# Patient Record
Sex: FEMALE | Race: OTHER | HISPANIC OR LATINO
[De-identification: names, ages, dates, MRNs, and addresses within clinical notes are randomized per-mention and may not be internally consistent; named-entity substitution may affect disease eponyms.]

---

## 2017-03-06 ENCOUNTER — APPOINTMENT (OUTPATIENT)
Dept: NEUROLOGY | Facility: CLINIC | Age: 57
End: 2017-03-06

## 2017-03-06 VITALS
HEIGHT: 63 IN | SYSTOLIC BLOOD PRESSURE: 144 MMHG | DIASTOLIC BLOOD PRESSURE: 87 MMHG | HEART RATE: 71 BPM | BODY MASS INDEX: 35.44 KG/M2 | WEIGHT: 200 LBS

## 2017-03-06 DIAGNOSIS — Z87.891 PERSONAL HISTORY OF NICOTINE DEPENDENCE: ICD-10-CM

## 2017-03-06 DIAGNOSIS — Z82.49 FAMILY HISTORY OF ISCHEMIC HEART DISEASE AND OTHER DISEASES OF THE CIRCULATORY SYSTEM: ICD-10-CM

## 2017-03-06 DIAGNOSIS — R73.03 PREDIABETES.: ICD-10-CM

## 2017-03-06 DIAGNOSIS — Z83.3 FAMILY HISTORY OF DIABETES MELLITUS: ICD-10-CM

## 2017-03-06 DIAGNOSIS — Z86.39 PERSONAL HISTORY OF OTHER ENDOCRINE, NUTRITIONAL AND METABOLIC DISEASE: ICD-10-CM

## 2017-03-06 DIAGNOSIS — M54.12 RADICULOPATHY, CERVICAL REGION: ICD-10-CM

## 2017-03-06 DIAGNOSIS — D34 BENIGN NEOPLASM OF THYROID GLAND: ICD-10-CM

## 2017-03-06 RX ORDER — CLOTRIMAZOLE AND BETAMETHASONE DIPROPIONATE 10; .5 MG/G; MG/G
1-0.05 CREAM TOPICAL
Qty: 15 | Refills: 0 | Status: COMPLETED | COMMUNITY
Start: 2016-09-20

## 2017-03-06 RX ORDER — FLUCONAZOLE 100 MG/1
100 TABLET ORAL
Qty: 10 | Refills: 0 | Status: COMPLETED | COMMUNITY
Start: 2016-09-20

## 2017-03-21 ENCOUNTER — APPOINTMENT (OUTPATIENT)
Dept: NEUROSURGERY | Facility: CLINIC | Age: 57
End: 2017-03-21

## 2017-03-21 VITALS
HEIGHT: 63 IN | BODY MASS INDEX: 35.44 KG/M2 | HEART RATE: 60 BPM | SYSTOLIC BLOOD PRESSURE: 130 MMHG | WEIGHT: 200 LBS | DIASTOLIC BLOOD PRESSURE: 80 MMHG

## 2017-03-21 DIAGNOSIS — G89.29 OTHER CHRONIC PAIN: ICD-10-CM

## 2017-03-21 DIAGNOSIS — Z86.69 PERSONAL HISTORY OF OTHER DISEASES OF THE NERVOUS SYSTEM AND SENSE ORGANS: ICD-10-CM

## 2017-03-28 ENCOUNTER — OUTPATIENT (OUTPATIENT)
Dept: OUTPATIENT SERVICES | Facility: HOSPITAL | Age: 57
LOS: 1 days | Discharge: HOME | End: 2017-03-28

## 2017-03-28 ENCOUNTER — APPOINTMENT (OUTPATIENT)
Dept: RADIATION ONCOLOGY | Facility: CLINIC | Age: 57
End: 2017-03-28

## 2017-03-28 VITALS
HEART RATE: 80 BPM | BODY MASS INDEX: 35.14 KG/M2 | HEIGHT: 62.99 IN | RESPIRATION RATE: 16 BRPM | WEIGHT: 198.3 LBS | SYSTOLIC BLOOD PRESSURE: 126 MMHG | DIASTOLIC BLOOD PRESSURE: 84 MMHG | OXYGEN SATURATION: 100 %

## 2017-03-28 RX ORDER — VINPOCETINE 100 %
POWDER (GRAM) MISCELLANEOUS
Refills: 0 | Status: ACTIVE | COMMUNITY

## 2017-03-28 RX ORDER — CARBAMAZEPINE 200 MG/1
200 TABLET ORAL TWICE DAILY
Qty: 60 | Refills: 3 | Status: DISCONTINUED | COMMUNITY
Start: 2017-03-06 | End: 2017-03-28

## 2017-03-29 ENCOUNTER — OUTPATIENT (OUTPATIENT)
Dept: OUTPATIENT SERVICES | Facility: HOSPITAL | Age: 57
LOS: 1 days | End: 2017-03-29
Payer: COMMERCIAL

## 2017-03-29 DIAGNOSIS — Z51.89 ENCOUNTER FOR OTHER SPECIFIED AFTERCARE: ICD-10-CM

## 2017-03-29 DIAGNOSIS — M54.2 CERVICALGIA: ICD-10-CM

## 2017-03-29 PROCEDURE — 97162 PT EVAL MOD COMPLEX 30 MIN: CPT

## 2017-04-02 ENCOUNTER — FORM ENCOUNTER (OUTPATIENT)
Age: 57
End: 2017-04-02

## 2017-04-03 ENCOUNTER — APPOINTMENT (OUTPATIENT)
Dept: NEUROLOGY | Facility: CLINIC | Age: 57
End: 2017-04-03

## 2017-04-03 ENCOUNTER — OUTPATIENT (OUTPATIENT)
Dept: OUTPATIENT SERVICES | Facility: HOSPITAL | Age: 57
LOS: 1 days | End: 2017-04-03
Payer: COMMERCIAL

## 2017-04-03 ENCOUNTER — APPOINTMENT (OUTPATIENT)
Dept: MRI IMAGING | Facility: IMAGING CENTER | Age: 57
End: 2017-04-03

## 2017-04-03 DIAGNOSIS — G50.0 TRIGEMINAL NEURALGIA: ICD-10-CM

## 2017-04-03 PROCEDURE — 61798 SRS CRANIAL LESION COMPLEX: CPT

## 2017-04-03 PROCEDURE — 61800 APPLY SRS HEADFRAME ADD-ON: CPT

## 2017-04-03 PROCEDURE — 76498 UNLISTED MR PROCEDURE: CPT

## 2017-05-30 ENCOUNTER — APPOINTMENT (OUTPATIENT)
Dept: NEUROLOGY | Facility: CLINIC | Age: 57
End: 2017-05-30

## 2017-05-30 ENCOUNTER — APPOINTMENT (OUTPATIENT)
Dept: NEUROSURGERY | Facility: CLINIC | Age: 57
End: 2017-05-30

## 2017-05-30 VITALS
WEIGHT: 198 LBS | BODY MASS INDEX: 36.44 KG/M2 | SYSTOLIC BLOOD PRESSURE: 130 MMHG | DIASTOLIC BLOOD PRESSURE: 80 MMHG | HEIGHT: 62 IN | HEART RATE: 77 BPM

## 2017-05-31 RX ORDER — ROSUVASTATIN CALCIUM 5 MG/1
5 TABLET, FILM COATED ORAL
Qty: 90 | Refills: 0 | Status: ACTIVE | COMMUNITY
Start: 2017-04-13

## 2017-06-19 ENCOUNTER — FORM ENCOUNTER (OUTPATIENT)
Age: 57
End: 2017-06-19

## 2017-06-20 ENCOUNTER — APPOINTMENT (OUTPATIENT)
Dept: MRI IMAGING | Facility: CLINIC | Age: 57
End: 2017-06-20

## 2017-06-20 ENCOUNTER — OUTPATIENT (OUTPATIENT)
Dept: OUTPATIENT SERVICES | Facility: HOSPITAL | Age: 57
LOS: 1 days | End: 2017-06-20
Payer: COMMERCIAL

## 2017-06-20 DIAGNOSIS — G50.0 TRIGEMINAL NEURALGIA: ICD-10-CM

## 2017-06-20 PROCEDURE — A9585: CPT

## 2017-06-20 PROCEDURE — 70553 MRI BRAIN STEM W/O & W/DYE: CPT

## 2017-07-11 ENCOUNTER — APPOINTMENT (OUTPATIENT)
Dept: NEUROSURGERY | Facility: CLINIC | Age: 57
End: 2017-07-11

## 2017-07-11 VITALS
HEART RATE: 70 BPM | DIASTOLIC BLOOD PRESSURE: 89 MMHG | WEIGHT: 211 LBS | SYSTOLIC BLOOD PRESSURE: 138 MMHG | HEIGHT: 62 IN | BODY MASS INDEX: 38.83 KG/M2

## 2017-07-11 DIAGNOSIS — Z92.3 PERSONAL HISTORY OF IRRADIATION: ICD-10-CM

## 2017-07-11 DIAGNOSIS — G50.0 TRIGEMINAL NEURALGIA: ICD-10-CM

## 2017-07-14 PROBLEM — Z92.3 STATUS POST GAMMA KNIFE TREATMENT: Status: ACTIVE | Noted: 2017-07-14

## 2017-07-14 PROBLEM — G50.0 TRIGEMINAL NEURALGIA: Status: ACTIVE | Noted: 2017-03-06

## 2017-08-08 ENCOUNTER — MEDICATION RENEWAL (OUTPATIENT)
Age: 57
End: 2017-08-08

## 2017-08-22 ENCOUNTER — APPOINTMENT (OUTPATIENT)
Dept: NEUROSURGERY | Facility: CLINIC | Age: 57
End: 2017-08-22

## 2017-09-11 ENCOUNTER — APPOINTMENT (OUTPATIENT)
Dept: NEUROSURGERY | Facility: CLINIC | Age: 57
End: 2017-09-11

## 2017-09-11 ENCOUNTER — APPOINTMENT (OUTPATIENT)
Dept: NEUROLOGY | Facility: CLINIC | Age: 57
End: 2017-09-11

## 2017-11-03 ENCOUNTER — EMERGENCY (EMERGENCY)
Facility: HOSPITAL | Age: 57
LOS: 1 days | Discharge: HOME | End: 2017-11-03
Attending: EMERGENCY MEDICINE
Payer: COMMERCIAL

## 2017-11-03 VITALS
WEIGHT: 210.1 LBS | RESPIRATION RATE: 20 BRPM | OXYGEN SATURATION: 97 % | SYSTOLIC BLOOD PRESSURE: 124 MMHG | TEMPERATURE: 100 F | HEIGHT: 70 IN | HEART RATE: 105 BPM | DIASTOLIC BLOOD PRESSURE: 85 MMHG

## 2017-11-03 VITALS
SYSTOLIC BLOOD PRESSURE: 110 MMHG | RESPIRATION RATE: 18 BRPM | TEMPERATURE: 99 F | DIASTOLIC BLOOD PRESSURE: 74 MMHG | HEART RATE: 87 BPM | OXYGEN SATURATION: 95 %

## 2017-11-03 LAB
ALBUMIN SERPL ELPH-MCNC: 4.1 G/DL — SIGNIFICANT CHANGE UP (ref 3.3–5.2)
ALP SERPL-CCNC: 136 U/L — HIGH (ref 40–120)
ALT FLD-CCNC: 49 U/L — HIGH
ANION GAP SERPL CALC-SCNC: 11 MMOL/L — SIGNIFICANT CHANGE UP (ref 5–17)
APPEARANCE UR: CLEAR — SIGNIFICANT CHANGE UP
AST SERPL-CCNC: 28 U/L — SIGNIFICANT CHANGE UP
BILIRUB SERPL-MCNC: 0.4 MG/DL — SIGNIFICANT CHANGE UP (ref 0.4–2)
BILIRUB UR-MCNC: NEGATIVE — SIGNIFICANT CHANGE UP
BUN SERPL-MCNC: 12 MG/DL — SIGNIFICANT CHANGE UP (ref 8–20)
CALCIUM SERPL-MCNC: 8.5 MG/DL — LOW (ref 8.6–10.2)
CHLORIDE SERPL-SCNC: 95 MMOL/L — LOW (ref 98–107)
CO2 SERPL-SCNC: 28 MMOL/L — SIGNIFICANT CHANGE UP (ref 22–29)
COLOR SPEC: YELLOW — SIGNIFICANT CHANGE UP
CREAT SERPL-MCNC: 0.63 MG/DL — SIGNIFICANT CHANGE UP (ref 0.5–1.3)
DIFF PNL FLD: ABNORMAL
EPI CELLS # UR: SIGNIFICANT CHANGE UP
GLUCOSE SERPL-MCNC: 125 MG/DL — HIGH (ref 70–115)
GLUCOSE UR QL: NEGATIVE MG/DL — SIGNIFICANT CHANGE UP
HCT VFR BLD CALC: 37.7 % — SIGNIFICANT CHANGE UP (ref 37–47)
HGB BLD-MCNC: 12.7 G/DL — SIGNIFICANT CHANGE UP (ref 12–16)
KETONES UR-MCNC: NEGATIVE — SIGNIFICANT CHANGE UP
LEUKOCYTE ESTERASE UR-ACNC: ABNORMAL
LIDOCAIN IGE QN: 24 U/L — SIGNIFICANT CHANGE UP (ref 22–51)
MCHC RBC-ENTMCNC: 29.9 PG — SIGNIFICANT CHANGE UP (ref 27–31)
MCHC RBC-ENTMCNC: 33.7 G/DL — SIGNIFICANT CHANGE UP (ref 32–36)
MCV RBC AUTO: 88.7 FL — SIGNIFICANT CHANGE UP (ref 81–99)
NITRITE UR-MCNC: NEGATIVE — SIGNIFICANT CHANGE UP
PH UR: 6.5 — SIGNIFICANT CHANGE UP (ref 5–8)
PLATELET # BLD AUTO: 171 K/UL — SIGNIFICANT CHANGE UP (ref 150–400)
POTASSIUM SERPL-MCNC: 4.1 MMOL/L — SIGNIFICANT CHANGE UP (ref 3.5–5.3)
POTASSIUM SERPL-SCNC: 4.1 MMOL/L — SIGNIFICANT CHANGE UP (ref 3.5–5.3)
PROT SERPL-MCNC: 7.5 G/DL — SIGNIFICANT CHANGE UP (ref 6.6–8.7)
PROT UR-MCNC: 30 MG/DL
RBC # BLD: 4.25 M/UL — LOW (ref 4.4–5.2)
RBC # FLD: 14 % — SIGNIFICANT CHANGE UP (ref 11–15.6)
RBC CASTS # UR COMP ASSIST: ABNORMAL /HPF (ref 0–4)
SODIUM SERPL-SCNC: 134 MMOL/L — LOW (ref 135–145)
SP GR SPEC: 1.01 — SIGNIFICANT CHANGE UP (ref 1.01–1.02)
UROBILINOGEN FLD QL: NEGATIVE MG/DL — SIGNIFICANT CHANGE UP
WBC # BLD: 13.2 K/UL — HIGH (ref 4.8–10.8)
WBC # FLD AUTO: 13.2 K/UL — HIGH (ref 4.8–10.8)
WBC UR QL: ABNORMAL

## 2017-11-03 PROCEDURE — 81001 URINALYSIS AUTO W/SCOPE: CPT

## 2017-11-03 PROCEDURE — 80053 COMPREHEN METABOLIC PANEL: CPT

## 2017-11-03 PROCEDURE — 96374 THER/PROPH/DIAG INJ IV PUSH: CPT

## 2017-11-03 PROCEDURE — 99284 EMERGENCY DEPT VISIT MOD MDM: CPT

## 2017-11-03 PROCEDURE — 83690 ASSAY OF LIPASE: CPT

## 2017-11-03 PROCEDURE — 99284 EMERGENCY DEPT VISIT MOD MDM: CPT | Mod: 25

## 2017-11-03 PROCEDURE — 85027 COMPLETE CBC AUTOMATED: CPT

## 2017-11-03 PROCEDURE — 36415 COLL VENOUS BLD VENIPUNCTURE: CPT

## 2017-11-03 PROCEDURE — 87086 URINE CULTURE/COLONY COUNT: CPT

## 2017-11-03 PROCEDURE — 96375 TX/PRO/DX INJ NEW DRUG ADDON: CPT

## 2017-11-03 RX ORDER — L.ACIDOPH/B.ANIMALIS/B.LONGUM 15B CELL
1 CAPSULE ORAL
Qty: 30 | Refills: 0
Start: 2017-11-03 | End: 2017-12-03

## 2017-11-03 RX ORDER — CEFTRIAXONE 500 MG/1
1 INJECTION, POWDER, FOR SOLUTION INTRAMUSCULAR; INTRAVENOUS ONCE
Qty: 0 | Refills: 0 | Status: COMPLETED | OUTPATIENT
Start: 2017-11-03 | End: 2017-11-03

## 2017-11-03 RX ORDER — CEPHALEXIN 500 MG
1 CAPSULE ORAL
Qty: 20 | Refills: 0
Start: 2017-11-03 | End: 2017-11-13

## 2017-11-03 RX ORDER — SODIUM CHLORIDE 9 MG/ML
1000 INJECTION INTRAMUSCULAR; INTRAVENOUS; SUBCUTANEOUS ONCE
Qty: 0 | Refills: 0 | Status: COMPLETED | OUTPATIENT
Start: 2017-11-03 | End: 2017-11-03

## 2017-11-03 RX ORDER — IBUPROFEN 200 MG
400 TABLET ORAL ONCE
Qty: 0 | Refills: 0 | Status: COMPLETED | OUTPATIENT
Start: 2017-11-03 | End: 2017-11-03

## 2017-11-03 RX ORDER — METOCLOPRAMIDE HCL 10 MG
10 TABLET ORAL ONCE
Qty: 0 | Refills: 0 | Status: COMPLETED | OUTPATIENT
Start: 2017-11-03 | End: 2017-11-03

## 2017-11-03 RX ORDER — DIPHENHYDRAMINE HCL 50 MG
25 CAPSULE ORAL ONCE
Qty: 0 | Refills: 0 | Status: COMPLETED | OUTPATIENT
Start: 2017-11-03 | End: 2017-11-03

## 2017-11-03 RX ADMIN — CEFTRIAXONE 100 GRAM(S): 500 INJECTION, POWDER, FOR SOLUTION INTRAMUSCULAR; INTRAVENOUS at 22:12

## 2017-11-03 RX ADMIN — SODIUM CHLORIDE 1000 MILLILITER(S): 9 INJECTION INTRAMUSCULAR; INTRAVENOUS; SUBCUTANEOUS at 22:13

## 2017-11-03 RX ADMIN — Medication 25 MILLIGRAM(S): at 22:13

## 2017-11-03 RX ADMIN — Medication 10 MILLIGRAM(S): at 22:13

## 2017-11-03 RX ADMIN — Medication 400 MILLIGRAM(S): at 22:13

## 2017-11-03 NOTE — ED STATDOCS - ATTENDING CONTRIBUTION TO CARE
I, Rebecca Greenwood, performed the initial face to face bedside interview with this patient regarding history of present illness, review of symptoms and relevant past medical, social and family history.  I completed an independent physical examination.  I was the initial provider who evaluated this patient. I have signed out the follow up of any pending tests (i.e. labs, radiological studies) to the ACP.  I have communicated the patient’s plan of care and disposition with the ACP.

## 2017-11-03 NOTE — ED STATDOCS - PROGRESS NOTE DETAILS
PA: pt seen and evaluated by intake doctor. agree with intake hpi and pe. will follow up plan as per attending. labs reviewed. pt advised on results including abnormal results. pt advised her LFTs are elevated at baseline. pt with uti, will treat witH 1g Rocephin in ed and rx keflex with probiotic. pt advised to follow up with pmd and given a copy of results. pt tolerating po without issue. All questions answered and concerns addressed. pt verbalized understanding and agreement with plan and dx. pt advised to follow up with PMD. pt advised to return to ed for worsening symptoms including fever, cp, sob. will dc.

## 2017-11-03 NOTE — ED ADULT NURSE NOTE - OBJECTIVE STATEMENT
Pt. complaining of vomiting and body aches x 2 days.  Pt. states "everyone at home is sick".  Pt. denies diarrhea.  No abdominal tenderness noted.  Denies chest pain.  Denies sob or bess.  Denies chills.  Denies dysuria/frequency/hematuria.  Pt. states she has had a decrease in PO intake x 2 days.

## 2017-11-05 LAB
CULTURE RESULTS: SIGNIFICANT CHANGE UP
SPECIMEN SOURCE: SIGNIFICANT CHANGE UP

## 2017-12-18 ENCOUNTER — EMERGENCY (EMERGENCY)
Facility: HOSPITAL | Age: 57
LOS: 1 days | Discharge: HOME | End: 2017-12-18
Attending: STUDENT IN AN ORGANIZED HEALTH CARE EDUCATION/TRAINING PROGRAM
Payer: COMMERCIAL

## 2017-12-18 VITALS
SYSTOLIC BLOOD PRESSURE: 150 MMHG | HEIGHT: 63 IN | HEART RATE: 83 BPM | WEIGHT: 216.93 LBS | OXYGEN SATURATION: 99 % | DIASTOLIC BLOOD PRESSURE: 94 MMHG | RESPIRATION RATE: 20 BRPM | TEMPERATURE: 98 F

## 2017-12-18 VITALS
TEMPERATURE: 97 F | SYSTOLIC BLOOD PRESSURE: 143 MMHG | RESPIRATION RATE: 17 BRPM | OXYGEN SATURATION: 100 % | DIASTOLIC BLOOD PRESSURE: 88 MMHG | HEART RATE: 79 BPM

## 2017-12-18 LAB
APPEARANCE UR: ABNORMAL
BACTERIA # UR AUTO: ABNORMAL
BILIRUB UR-MCNC: NEGATIVE — SIGNIFICANT CHANGE UP
COLOR SPEC: ABNORMAL
DIFF PNL FLD: ABNORMAL
EPI CELLS # UR: SIGNIFICANT CHANGE UP
GLUCOSE UR QL: NEGATIVE MG/DL — SIGNIFICANT CHANGE UP
KETONES UR-MCNC: NEGATIVE — SIGNIFICANT CHANGE UP
LEUKOCYTE ESTERASE UR-ACNC: ABNORMAL
NITRITE UR-MCNC: NEGATIVE — SIGNIFICANT CHANGE UP
PH UR: 6.5 — SIGNIFICANT CHANGE UP (ref 5–8)
PROT UR-MCNC: 100 MG/DL
RBC CASTS # UR COMP ASSIST: ABNORMAL /HPF (ref 0–4)
SP GR SPEC: 1 — LOW (ref 1.01–1.02)
UROBILINOGEN FLD QL: NEGATIVE MG/DL — SIGNIFICANT CHANGE UP
WBC UR QL: ABNORMAL

## 2017-12-18 PROCEDURE — 81001 URINALYSIS AUTO W/SCOPE: CPT

## 2017-12-18 PROCEDURE — 99283 EMERGENCY DEPT VISIT LOW MDM: CPT

## 2017-12-18 PROCEDURE — 87086 URINE CULTURE/COLONY COUNT: CPT

## 2017-12-18 PROCEDURE — 87186 SC STD MICRODIL/AGAR DIL: CPT

## 2017-12-18 RX ORDER — CEPHALEXIN 500 MG
500 CAPSULE ORAL ONCE
Qty: 0 | Refills: 0 | Status: COMPLETED | OUTPATIENT
Start: 2017-12-18 | End: 2017-12-18

## 2017-12-18 RX ORDER — CEPHALEXIN 500 MG
1 CAPSULE ORAL
Qty: 28 | Refills: 0
Start: 2017-12-18 | End: 2017-12-24

## 2017-12-18 RX ADMIN — Medication 500 MILLIGRAM(S): at 23:51

## 2017-12-18 NOTE — ED PROVIDER NOTE - OBJECTIVE STATEMENT
56 y/o F pt with a pmhx of trigeminal neurologia, HTN, high cholesterol and Diverticulosis presents to the ED c/o burning on urination, suprapubic pain and hematuria x1 day. She explains that she felt burning that onset 1 day ago. Today she attempted to drink more water and cranberry juice with mild relief. She explains that she saw blood in her urine, which cause her to come to the ED today. Reports that her pain is only when she passes urine. No Hx of Renal stones. Pt has not taken pain medication for the issue. Denies fever, chills, n/v/d, back pain, chest pain, sob, blurred vision, sinus pressure, STD exposure, rash, anxiety, HA or any other complaints. NKDA.

## 2017-12-18 NOTE — ED ADULT NURSE NOTE - OBJECTIVE STATEMENT
PT axox3 c/o painful urination. Pt denies any blood in urine, fevers and states it started x 2 days ago. Pt states she has been having frequent utis.

## 2017-12-18 NOTE — ED PROVIDER NOTE - ATTENDING CONTRIBUTION TO CARE
56 yo female with acute UTI symptoms. I personally saw the patient with the PA, and completed the key components of the history and physical exam. I then discussed the management plan with the PA.

## 2017-12-18 NOTE — ED PROVIDER NOTE - PROGRESS NOTE DETAILS
UA is reviewed, noted a urinary tract infection, will give patient first dosage of keflex here, send over keflex to patients pharmacy, explained that urine culture results will not be in for 1-2 days, pt verbalizes understanding results and discharge instructions

## 2018-07-28 PROBLEM — R73.03 PREDIABETES: Status: RESOLVED | Noted: 2017-03-06 | Resolved: 2018-07-28

## 2018-08-01 PROBLEM — E78.00 PURE HYPERCHOLESTEROLEMIA, UNSPECIFIED: Chronic | Status: ACTIVE | Noted: 2017-11-04

## 2018-08-01 PROBLEM — G50.0 TRIGEMINAL NEURALGIA: Chronic | Status: ACTIVE | Noted: 2017-11-04

## 2018-08-01 PROBLEM — K57.90 DIVERTICULOSIS OF INTESTINE, PART UNSPECIFIED, WITHOUT PERFORATION OR ABSCESS WITHOUT BLEEDING: Chronic | Status: ACTIVE | Noted: 2017-11-04

## 2018-08-07 ENCOUNTER — APPOINTMENT (OUTPATIENT)
Dept: MRI IMAGING | Facility: CLINIC | Age: 58
End: 2018-08-07

## 2018-09-06 ENCOUNTER — APPOINTMENT (OUTPATIENT)
Dept: GASTROENTEROLOGY | Facility: CLINIC | Age: 58
End: 2018-09-06
Payer: COMMERCIAL

## 2018-09-06 VITALS
OXYGEN SATURATION: 99 % | HEIGHT: 62 IN | WEIGHT: 216 LBS | RESPIRATION RATE: 15 BRPM | DIASTOLIC BLOOD PRESSURE: 82 MMHG | HEART RATE: 68 BPM | SYSTOLIC BLOOD PRESSURE: 130 MMHG | BODY MASS INDEX: 39.75 KG/M2

## 2018-09-06 PROCEDURE — 99214 OFFICE O/P EST MOD 30 MIN: CPT

## 2018-09-06 RX ORDER — GABAPENTIN 100 MG/1
100 CAPSULE ORAL 3 TIMES DAILY
Qty: 90 | Refills: 6 | Status: DISCONTINUED | COMMUNITY
Start: 2017-05-10 | End: 2018-09-06

## 2018-09-06 RX ORDER — BACLOFEN 10 MG/1
10 TABLET ORAL
Qty: 135 | Refills: 3 | Status: DISCONTINUED | COMMUNITY
Start: 2017-06-26 | End: 2018-09-06

## 2018-09-06 RX ORDER — GABAPENTIN 800 MG/1
800 TABLET, COATED ORAL 3 TIMES DAILY
Qty: 90 | Refills: 6 | Status: ACTIVE | COMMUNITY
Start: 2017-06-05

## 2018-10-05 NOTE — ED STATDOCS - OBJECTIVE STATEMENT
56 y/o F pt with a PMHx of diverticulosis, HTN, high cholesterol, hiatal hernia, trigeminal neuralgia presents to the ED c/o vomiting, abdominal pain, myalgia and headache x2 days. Explains that she mad a different recipe than normal, which she believes onset the vomiting. Describes the vomit as "black." Last endoscopy was a couple of months ago, no gross findings. Denies d/c, fever, urinary symptoms, back pain, chest pain, SOB or any other complaints. NKDA. negative... 58 y/o F pt with a PMHx of diverticulosis, HTN, high cholesterol, hiatal hernia, trigeminal neuralgia presents to the ED c/o vomiting, abdominal pain, myalgia and headache x2 days. Explains that she mad a different recipe than normal, which she believes onset the vomiting. Describes the vomit as "black" but her stools are normal color. Last endoscopy was a couple of months ago, no gross findings. Denies d/c, fever, urinary symptoms, back pain, chest pain, SOB or any other complaints. NKDA.  Pt does take a PPI. No blood thinners.

## 2018-11-05 NOTE — ED ADULT NURSE NOTE - CINV DISCH TEACH PARTICIP
Patient
no rash/no shortness of breath/no vomiting/no chills/no headache/no abdominal pain/no fever/no diarrhea/no decreased eating/drinking

## 2019-02-06 ENCOUNTER — APPOINTMENT (OUTPATIENT)
Dept: GASTROENTEROLOGY | Facility: CLINIC | Age: 59
End: 2019-02-06
Payer: COMMERCIAL

## 2019-02-06 VITALS
OXYGEN SATURATION: 100 % | HEART RATE: 82 BPM | SYSTOLIC BLOOD PRESSURE: 122 MMHG | BODY MASS INDEX: 38.64 KG/M2 | DIASTOLIC BLOOD PRESSURE: 78 MMHG | WEIGHT: 210 LBS | HEIGHT: 62 IN | RESPIRATION RATE: 16 BRPM

## 2019-02-06 PROCEDURE — 99214 OFFICE O/P EST MOD 30 MIN: CPT

## 2019-02-06 NOTE — PHYSICAL EXAM
[General Appearance - Alert] : alert [General Appearance - In No Acute Distress] : in no acute distress [Sclera] : the sclera and conjunctiva were normal [PERRL With Normal Accommodation] : pupils were equal in size, round, and reactive to light [Extraocular Movements] : extraocular movements were intact [Outer Ear] : the ears and nose were normal in appearance [Oropharynx] : the oropharynx was normal [Neck Appearance] : the appearance of the neck was normal [Neck Cervical Mass (___cm)] : no neck mass was observed [Jugular Venous Distention Increased] : there was no jugular-venous distention [Thyroid Diffuse Enlargement] : the thyroid was not enlarged [Thyroid Nodule] : there were no palpable thyroid nodules [Auscultation Breath Sounds / Voice Sounds] : lungs were clear to auscultation bilaterally [Heart Rate And Rhythm] : heart rate was normal and rhythm regular [Heart Sounds] : normal S1 and S2 [Heart Sounds Gallop] : no gallops [Murmurs] : no murmurs [Heart Sounds Pericardial Friction Rub] : no pericardial rub [Full Pulse] : the pedal pulses are present [Edema] : there was no peripheral edema [Bowel Sounds] : normal bowel sounds [Abdomen Soft] : soft [Abdomen Tenderness] : non-tender [Abdomen Mass (___ Cm)] : no abdominal mass palpated [Cervical Lymph Nodes Enlarged Posterior Bilaterally] : posterior cervical [Cervical Lymph Nodes Enlarged Anterior Bilaterally] : anterior cervical [Supraclavicular Lymph Nodes Enlarged Bilaterally] : supraclavicular [No CVA Tenderness] : no ~M costovertebral angle tenderness [No Spinal Tenderness] : no spinal tenderness [Skin Color & Pigmentation] : normal skin color and pigmentation [Skin Turgor] : normal skin turgor [] : no rash [No Focal Deficits] : no focal deficits [Oriented To Time, Place, And Person] : oriented to person, place, and time [Impaired Insight] : insight and judgment were intact [Affect] : the affect was normal

## 2019-02-07 NOTE — HISTORY OF PRESENT ILLNESS
[de-identified] : The patient overnight for evaluation for reflux. She had been complaining of right upper quadrant painand recently underwent cholecystectomy. Since her cholecystectomy she has been having worsening off of reflux she takes omeprazole on a daily basis. She had a CT performed in 2016 which will be esophagitis.Colonoscopy performed in March 2016 was essentially normal except internal hemorrhoids. EGD biopsies were negative for any Silverman's esophagus or any Helicobacter pylori. She denies any smoking, alcohol or drug use.\par \par  Had reflux symptoms are not under control with PPI therapy.

## 2019-02-07 NOTE — ASSESSMENT
[FreeTextEntry1] : I discussed her symptoms at length. At this time, I am recommending starting pantoprazole 40 mg orally once daily. She can also start H2 blocker therapy at bedt time.If her symptoms are not under good control, she may need an upper endoscopy.We will followup on a short-term basis.

## 2019-03-29 ENCOUNTER — APPOINTMENT (OUTPATIENT)
Dept: GASTROENTEROLOGY | Facility: CLINIC | Age: 59
End: 2019-03-29
Payer: COMMERCIAL

## 2019-03-29 VITALS
OXYGEN SATURATION: 98 % | HEIGHT: 62 IN | BODY MASS INDEX: 37.17 KG/M2 | DIASTOLIC BLOOD PRESSURE: 96 MMHG | RESPIRATION RATE: 15 BRPM | HEART RATE: 75 BPM | WEIGHT: 202 LBS | SYSTOLIC BLOOD PRESSURE: 133 MMHG

## 2019-03-29 DIAGNOSIS — K44.9 DIAPHRAGMATIC HERNIA W/OUT OBSTRUCTION OR GANGRENE: ICD-10-CM

## 2019-03-29 DIAGNOSIS — K21.0 DIAPHRAGMATIC HERNIA W/OUT OBSTRUCTION OR GANGRENE: ICD-10-CM

## 2019-03-29 PROCEDURE — 99214 OFFICE O/P EST MOD 30 MIN: CPT

## 2019-03-29 RX ORDER — PANTOPRAZOLE 40 MG/1
40 TABLET, DELAYED RELEASE ORAL DAILY
Qty: 30 | Refills: 2 | Status: COMPLETED | COMMUNITY
Start: 2019-02-06 | End: 2019-03-29

## 2019-03-29 NOTE — ASSESSMENT
[FreeTextEntry1] : I am glad the patient is doing better. We will perform an upper endoscopy in 2 months to document if there is any significant esophagitis.if the upper endoscopy is unremarkable, we will taper the dose of omeprazole. Risks (including bleeding, pain, perforation, incomplete examination, adverse reactions to medications, aspiration and death), benefits and alternatives were discussed. Patient is agreeable for the EGD. The patient is medically optimized for the procedure. We will schedule the patient for the procedure.\par

## 2019-06-15 ENCOUNTER — EMERGENCY (EMERGENCY)
Facility: HOSPITAL | Age: 59
LOS: 1 days | Discharge: HOME | End: 2019-06-15
Attending: EMERGENCY MEDICINE
Payer: COMMERCIAL

## 2019-06-15 VITALS
HEART RATE: 88 BPM | OXYGEN SATURATION: 98 % | DIASTOLIC BLOOD PRESSURE: 88 MMHG | HEIGHT: 65 IN | SYSTOLIC BLOOD PRESSURE: 150 MMHG | WEIGHT: 205.03 LBS | TEMPERATURE: 102 F | RESPIRATION RATE: 18 BRPM

## 2019-06-15 DIAGNOSIS — Z90.49 ACQUIRED ABSENCE OF OTHER SPECIFIED PARTS OF DIGESTIVE TRACT: Chronic | ICD-10-CM

## 2019-06-15 LAB
ALBUMIN SERPL ELPH-MCNC: 4.5 G/DL — SIGNIFICANT CHANGE UP (ref 3.3–5.2)
ALP SERPL-CCNC: 160 U/L — HIGH (ref 40–120)
ALT FLD-CCNC: 38 U/L — HIGH
ANION GAP SERPL CALC-SCNC: 13 MMOL/L — SIGNIFICANT CHANGE UP (ref 5–17)
APPEARANCE UR: CLEAR — SIGNIFICANT CHANGE UP
AST SERPL-CCNC: 34 U/L — HIGH
BASOPHILS # BLD AUTO: 0 K/UL — SIGNIFICANT CHANGE UP (ref 0–0.2)
BASOPHILS NFR BLD AUTO: 0.2 % — SIGNIFICANT CHANGE UP (ref 0–2)
BILIRUB SERPL-MCNC: 0.4 MG/DL — SIGNIFICANT CHANGE UP (ref 0.4–2)
BILIRUB UR-MCNC: NEGATIVE — SIGNIFICANT CHANGE UP
BUN SERPL-MCNC: 10 MG/DL — SIGNIFICANT CHANGE UP (ref 8–20)
CALCIUM SERPL-MCNC: 9 MG/DL — SIGNIFICANT CHANGE UP (ref 8.6–10.2)
CHLORIDE SERPL-SCNC: 98 MMOL/L — SIGNIFICANT CHANGE UP (ref 98–107)
CO2 SERPL-SCNC: 25 MMOL/L — SIGNIFICANT CHANGE UP (ref 22–29)
COLOR SPEC: YELLOW — SIGNIFICANT CHANGE UP
CREAT SERPL-MCNC: 0.61 MG/DL — SIGNIFICANT CHANGE UP (ref 0.5–1.3)
DIFF PNL FLD: ABNORMAL
EOSINOPHIL # BLD AUTO: 0 K/UL — SIGNIFICANT CHANGE UP (ref 0–0.5)
EOSINOPHIL NFR BLD AUTO: 0 % — SIGNIFICANT CHANGE UP (ref 0–6)
GAS PNL BLDV: SIGNIFICANT CHANGE UP
GLUCOSE SERPL-MCNC: 123 MG/DL — HIGH (ref 70–115)
GLUCOSE UR QL: NEGATIVE MG/DL — SIGNIFICANT CHANGE UP
HCT VFR BLD CALC: 38.1 % — SIGNIFICANT CHANGE UP (ref 37–47)
HGB BLD-MCNC: 13.2 G/DL — SIGNIFICANT CHANGE UP (ref 12–16)
KETONES UR-MCNC: NEGATIVE — SIGNIFICANT CHANGE UP
LEUKOCYTE ESTERASE UR-ACNC: ABNORMAL
LIDOCAIN IGE QN: 41 U/L — SIGNIFICANT CHANGE UP (ref 22–51)
LYMPHOCYTES # BLD AUTO: 1 K/UL — SIGNIFICANT CHANGE UP (ref 1–4.8)
LYMPHOCYTES # BLD AUTO: 10.7 % — LOW (ref 20–55)
MCHC RBC-ENTMCNC: 30.8 PG — SIGNIFICANT CHANGE UP (ref 27–31)
MCHC RBC-ENTMCNC: 34.6 G/DL — SIGNIFICANT CHANGE UP (ref 32–36)
MCV RBC AUTO: 88.8 FL — SIGNIFICANT CHANGE UP (ref 81–99)
MONOCYTES # BLD AUTO: 0.7 K/UL — SIGNIFICANT CHANGE UP (ref 0–0.8)
MONOCYTES NFR BLD AUTO: 6.8 % — SIGNIFICANT CHANGE UP (ref 3–10)
NEUTROPHILS # BLD AUTO: 8 K/UL — SIGNIFICANT CHANGE UP (ref 1.8–8)
NEUTROPHILS NFR BLD AUTO: 82.1 % — HIGH (ref 37–73)
NITRITE UR-MCNC: NEGATIVE — SIGNIFICANT CHANGE UP
PH UR: 9 — HIGH (ref 5–8)
PLATELET # BLD AUTO: 200 K/UL — SIGNIFICANT CHANGE UP (ref 150–400)
POTASSIUM SERPL-MCNC: 3.9 MMOL/L — SIGNIFICANT CHANGE UP (ref 3.5–5.3)
POTASSIUM SERPL-SCNC: 3.9 MMOL/L — SIGNIFICANT CHANGE UP (ref 3.5–5.3)
PROT SERPL-MCNC: 7.6 G/DL — SIGNIFICANT CHANGE UP (ref 6.6–8.7)
PROT UR-MCNC: NEGATIVE MG/DL — SIGNIFICANT CHANGE UP
RBC # BLD: 4.29 M/UL — LOW (ref 4.4–5.2)
RBC # FLD: 13.3 % — SIGNIFICANT CHANGE UP (ref 11–15.6)
SODIUM SERPL-SCNC: 136 MMOL/L — SIGNIFICANT CHANGE UP (ref 135–145)
SP GR SPEC: 1.01 — SIGNIFICANT CHANGE UP (ref 1.01–1.02)
UROBILINOGEN FLD QL: NEGATIVE MG/DL — SIGNIFICANT CHANGE UP
WBC # BLD: 9.8 K/UL — SIGNIFICANT CHANGE UP (ref 4.8–10.8)
WBC # FLD AUTO: 9.8 K/UL — SIGNIFICANT CHANGE UP (ref 4.8–10.8)

## 2019-06-15 PROCEDURE — 96374 THER/PROPH/DIAG INJ IV PUSH: CPT

## 2019-06-15 PROCEDURE — 96375 TX/PRO/DX INJ NEW DRUG ADDON: CPT

## 2019-06-15 PROCEDURE — 96361 HYDRATE IV INFUSION ADD-ON: CPT

## 2019-06-15 PROCEDURE — 82435 ASSAY OF BLOOD CHLORIDE: CPT

## 2019-06-15 PROCEDURE — 80053 COMPREHEN METABOLIC PANEL: CPT

## 2019-06-15 PROCEDURE — 99284 EMERGENCY DEPT VISIT MOD MDM: CPT | Mod: 25

## 2019-06-15 PROCEDURE — 93010 ELECTROCARDIOGRAM REPORT: CPT

## 2019-06-15 PROCEDURE — 85027 COMPLETE CBC AUTOMATED: CPT

## 2019-06-15 PROCEDURE — 82947 ASSAY GLUCOSE BLOOD QUANT: CPT

## 2019-06-15 PROCEDURE — 71046 X-RAY EXAM CHEST 2 VIEWS: CPT | Mod: 26

## 2019-06-15 PROCEDURE — 83605 ASSAY OF LACTIC ACID: CPT

## 2019-06-15 PROCEDURE — 87040 BLOOD CULTURE FOR BACTERIA: CPT

## 2019-06-15 PROCEDURE — 84132 ASSAY OF SERUM POTASSIUM: CPT

## 2019-06-15 PROCEDURE — 71046 X-RAY EXAM CHEST 2 VIEWS: CPT

## 2019-06-15 PROCEDURE — 99284 EMERGENCY DEPT VISIT MOD MDM: CPT

## 2019-06-15 PROCEDURE — 83690 ASSAY OF LIPASE: CPT

## 2019-06-15 PROCEDURE — 82803 BLOOD GASES ANY COMBINATION: CPT

## 2019-06-15 PROCEDURE — 81001 URINALYSIS AUTO W/SCOPE: CPT

## 2019-06-15 PROCEDURE — 93005 ELECTROCARDIOGRAM TRACING: CPT

## 2019-06-15 PROCEDURE — 82330 ASSAY OF CALCIUM: CPT

## 2019-06-15 PROCEDURE — 87086 URINE CULTURE/COLONY COUNT: CPT

## 2019-06-15 PROCEDURE — 36415 COLL VENOUS BLD VENIPUNCTURE: CPT

## 2019-06-15 PROCEDURE — 85014 HEMATOCRIT: CPT

## 2019-06-15 PROCEDURE — 84295 ASSAY OF SERUM SODIUM: CPT

## 2019-06-15 RX ORDER — IBUPROFEN 200 MG
400 TABLET ORAL ONCE
Refills: 0 | Status: COMPLETED | OUTPATIENT
Start: 2019-06-15 | End: 2019-06-15

## 2019-06-15 RX ORDER — ONDANSETRON 8 MG/1
4 TABLET, FILM COATED ORAL ONCE
Refills: 0 | Status: COMPLETED | OUTPATIENT
Start: 2019-06-15 | End: 2019-06-15

## 2019-06-15 RX ORDER — FAMOTIDINE 10 MG/ML
20 INJECTION INTRAVENOUS ONCE
Refills: 0 | Status: COMPLETED | OUTPATIENT
Start: 2019-06-15 | End: 2019-06-15

## 2019-06-15 RX ORDER — ACETAMINOPHEN 500 MG
650 TABLET ORAL ONCE
Refills: 0 | Status: COMPLETED | OUTPATIENT
Start: 2019-06-15 | End: 2019-06-15

## 2019-06-15 RX ORDER — SODIUM CHLORIDE 9 MG/ML
1000 INJECTION INTRAMUSCULAR; INTRAVENOUS; SUBCUTANEOUS ONCE
Refills: 0 | Status: COMPLETED | OUTPATIENT
Start: 2019-06-15 | End: 2019-06-15

## 2019-06-15 RX ADMIN — FAMOTIDINE 20 MILLIGRAM(S): 10 INJECTION INTRAVENOUS at 20:27

## 2019-06-15 RX ADMIN — ONDANSETRON 4 MILLIGRAM(S): 8 TABLET, FILM COATED ORAL at 20:27

## 2019-06-15 RX ADMIN — ONDANSETRON 4 MILLIGRAM(S): 8 TABLET, FILM COATED ORAL at 23:38

## 2019-06-15 RX ADMIN — SODIUM CHLORIDE 1000 MILLILITER(S): 9 INJECTION INTRAMUSCULAR; INTRAVENOUS; SUBCUTANEOUS at 21:27

## 2019-06-15 RX ADMIN — Medication 400 MILLIGRAM(S): at 23:37

## 2019-06-15 RX ADMIN — SODIUM CHLORIDE 1000 MILLILITER(S): 9 INJECTION INTRAMUSCULAR; INTRAVENOUS; SUBCUTANEOUS at 20:27

## 2019-06-15 RX ADMIN — Medication 650 MILLIGRAM(S): at 20:27

## 2019-06-15 RX ADMIN — Medication 650 MILLIGRAM(S): at 21:00

## 2019-06-15 NOTE — ED PROVIDER NOTE - CONSTITUTIONAL, MLM
normal... Well appearing, well nourished, awake, alert, oriented to person, place, time/situation and in no apparent distress. Non toxic, awake, alert, oriented to person, place, time/situation and in no apparent distress.

## 2019-06-15 NOTE — ED ADULT TRIAGE NOTE - CHIEF COMPLAINT QUOTE
pt BIBA for reports of vomiting and chills, states began few hours ago. began with epigastric pain. denies chest pain, no SOB.

## 2019-06-15 NOTE — ED PROVIDER NOTE - CLINICAL SUMMARY MEDICAL DECISION MAKING FREE TEXT BOX
likely viral gastritis, check labs, IV fluids, control fever, reevaul after IV Pepsid, Zofran and Tylenol.,

## 2019-06-15 NOTE — ED PROVIDER NOTE - ENMT, MLM
Airway patent, Nasal mucosa clear. Mouth with normal mucosa. Throat has no vesicles, no oropharyngeal exudates and uvula is midline. Neck supple, full ROM.

## 2019-06-15 NOTE — ED ADULT NURSE NOTE - OBJECTIVE STATEMENT
pt presents to ER with abd pain, which started today. pt had one episode of emesis. pt denies diarrhea, sick contact, burning when urination. pt states she gets these abd pains and takes omeprazole/gabapentin to manage the pain. last bowel movement was yesterday.

## 2019-06-15 NOTE — ED PROVIDER NOTE - OBJECTIVE STATEMENT
60y/o F with PMHx of HTN and hiatal hernia and PSHx of gall bladder removal presents to the ED feeling dizzy since 5am. Pt has been throwing up through the day. Pt has upper gas pains. Assoc. sx of chills, shaking and fever.  Pt feels low energy and like she is going to pass out.  Pt sees a Neurologist.  Pt was recently on medication for HTN, but stops for adverse reactions. Denies dirrahea, urinary symptoms or back pain. Pt additionally has pain in her legs. Pt recently took Charcole medication. Denies cold sx. 60y/o F with PMHx of Trigeminal neuralgia, Diverticulitis, HTN and hiatal hernia and PSHx of Cholecystectomy (Jan 2019) presents to the ED c/o sudden onset upper abdominal pain and dizziness beginning 17:00pm. Pt ate boiled eggs tonight, then shortly after at approx 17:00 began to feel epigastric pain, gas pains, chills, shaking, dizziness, UE tingling, LE pain, and weakness.  Pt describes weakness as low energy and like she is going to pass out.  Pt sees a Neurologist at [facility] for her Trigeminal neuralgia, she takes gabapentin and carbamazepine.  Pt was recently on medication for HTN, but was taking off the medication for adverse reactions of tingling. Pt recently took Charcoal medication today. Denies diarrhea, urinary symptoms, back pain. cold sx, rash. No additional complaints at this time.  PMD: Dr. Melissa Cohn 60y/o F with PMHx of Trigeminal neuralgia, Diverticulitis, HTN and hiatal hernia and PSHx of Cholecystectomy (Jan 2019) presents to the ED c/o sudden onset upper abdominal pain and dizziness beginning 17:00pm. Pt ate boiled eggs tonight, then shortly after at approx 17:00 began to feel epigastric pain, gas pains, nausea, chills, shaking, dizziness, UE tingling, LE pain, and weakness.  Pt has had several episodes of vomiting since her sx began. Pt describes weakness as low energy and like she is going to pass out.  Pt sees a Neurologist at [facility] for her Trigeminal neuralgia, she takes gabapentin and carbamazepine.  Pt was recently on medication for HTN, but was taking off the medication for adverse reactions of tingling. Pt recently took Charcoal medication today. Denies diarrhea, urinary symptoms, back pain. cold sx, rash. No additional complaints at this time.  PMD: Dr. Melissa Cohn

## 2019-06-15 NOTE — ED PROVIDER NOTE - PROGRESS NOTE DETAILS
Labs grossly wnl, cxr wnl. Patient stating she feels better, desires d/c home  Will d/c home with return precaution, zofran/pepcid, instructions to f/u with primary medical doctor  Angus Jiménez MD, PGY2 Emergency Medicine

## 2019-06-15 NOTE — ED ADULT NURSE NOTE - NSIMPLEMENTINTERV_GEN_ALL_ED
Implemented All Universal Safety Interventions:  [place] to call system. Call bell, personal items and telephone within reach. Instruct patient to call for assistance. Room bathroom lighting operational. Non-slip footwear when patient is off stretcher. Physically safe environment: no spills, clutter or unnecessary equipment. Stretcher in lowest position, wheels locked, appropriate side rails in place.

## 2019-06-16 VITALS — DIASTOLIC BLOOD PRESSURE: 72 MMHG | SYSTOLIC BLOOD PRESSURE: 121 MMHG

## 2019-06-16 RX ORDER — ONDANSETRON 8 MG/1
1 TABLET, FILM COATED ORAL
Qty: 12 | Refills: 0
Start: 2019-06-16 | End: 2019-06-19

## 2019-06-16 RX ORDER — FAMOTIDINE 10 MG/ML
1 INJECTION INTRAVENOUS
Qty: 14 | Refills: 0
Start: 2019-06-16 | End: 2019-06-29

## 2019-06-16 RX ADMIN — Medication 400 MILLIGRAM(S): at 00:15

## 2019-06-17 PROBLEM — K44.9 DIAPHRAGMATIC HERNIA WITHOUT OBSTRUCTION OR GANGRENE: Chronic | Status: ACTIVE | Noted: 2019-06-15

## 2019-06-17 LAB
CULTURE RESULTS: NO GROWTH — SIGNIFICANT CHANGE UP
SPECIMEN SOURCE: SIGNIFICANT CHANGE UP

## 2019-06-20 LAB
CULTURE RESULTS: SIGNIFICANT CHANGE UP
CULTURE RESULTS: SIGNIFICANT CHANGE UP
SPECIMEN SOURCE: SIGNIFICANT CHANGE UP
SPECIMEN SOURCE: SIGNIFICANT CHANGE UP

## 2019-09-06 ENCOUNTER — OUTPATIENT (OUTPATIENT)
Dept: OUTPATIENT SERVICES | Facility: HOSPITAL | Age: 59
LOS: 1 days | End: 2019-09-06
Payer: COMMERCIAL

## 2019-09-06 ENCOUNTER — RESULT REVIEW (OUTPATIENT)
Age: 59
End: 2019-09-06

## 2019-09-06 ENCOUNTER — APPOINTMENT (OUTPATIENT)
Age: 59
End: 2019-09-06
Payer: COMMERCIAL

## 2019-09-06 DIAGNOSIS — Z90.49 ACQUIRED ABSENCE OF OTHER SPECIFIED PARTS OF DIGESTIVE TRACT: Chronic | ICD-10-CM

## 2019-09-06 DIAGNOSIS — K21.9 GASTRO-ESOPHAGEAL REFLUX DISEASE WITHOUT ESOPHAGITIS: ICD-10-CM

## 2019-09-06 PROCEDURE — 43239 EGD BIOPSY SINGLE/MULTIPLE: CPT

## 2019-09-06 PROCEDURE — 88305 TISSUE EXAM BY PATHOLOGIST: CPT | Mod: 26

## 2019-09-06 PROCEDURE — 88305 TISSUE EXAM BY PATHOLOGIST: CPT

## 2019-09-06 PROCEDURE — 88342 IMHCHEM/IMCYTCHM 1ST ANTB: CPT

## 2019-09-06 PROCEDURE — 88342 IMHCHEM/IMCYTCHM 1ST ANTB: CPT | Mod: 26

## 2019-09-06 NOTE — PHYSICAL EXAM
[General Appearance - In No Acute Distress] : in no acute distress [General Appearance - Alert] : alert [Sclera] : the sclera and conjunctiva were normal [Extraocular Movements] : extraocular movements were intact [PERRL With Normal Accommodation] : pupils were equal in size, round, and reactive to light [Outer Ear] : the ears and nose were normal in appearance [Oropharynx] : the oropharynx was normal [Neck Appearance] : the appearance of the neck was normal [Neck Cervical Mass (___cm)] : no neck mass was observed [Jugular Venous Distention Increased] : there was no jugular-venous distention [Thyroid Nodule] : there were no palpable thyroid nodules [Thyroid Diffuse Enlargement] : the thyroid was not enlarged [Auscultation Breath Sounds / Voice Sounds] : lungs were clear to auscultation bilaterally [Heart Rate And Rhythm] : heart rate was normal and rhythm regular [Heart Sounds] : normal S1 and S2 [Murmurs] : no murmurs [Heart Sounds Gallop] : no gallops [Heart Sounds Pericardial Friction Rub] : no pericardial rub [Edema] : there was no peripheral edema [Full Pulse] : the pedal pulses are present [Abdomen Soft] : soft [Bowel Sounds] : normal bowel sounds [Abdomen Tenderness] : non-tender [Abdomen Mass (___ Cm)] : no abdominal mass palpated [Cervical Lymph Nodes Enlarged Anterior Bilaterally] : anterior cervical [Cervical Lymph Nodes Enlarged Posterior Bilaterally] : posterior cervical [Supraclavicular Lymph Nodes Enlarged Bilaterally] : supraclavicular [No CVA Tenderness] : no ~M costovertebral angle tenderness [No Spinal Tenderness] : no spinal tenderness [Skin Color & Pigmentation] : normal skin color and pigmentation [] : no rash [No Focal Deficits] : no focal deficits [Skin Turgor] : normal skin turgor [Impaired Insight] : insight and judgment were intact [Oriented To Time, Place, And Person] : oriented to person, place, and time [Affect] : the affect was normal

## 2019-09-06 NOTE — PROCEDURE
[With Biopsy] : with biopsy [Dyspepsia] : dyspepsia [Allergies Reviewed] : allergies reviewed. [Procedure Explained] : The procedure was explained [Risks] : Risks [Benefits] : benefits [Alternatives] : alternatives [Consent Obtained] : written consent was obtained prior to the procedure and is detailed in the patient's record [Patient] : the patient [Automated Blood Pressure Cuff] : automated blood pressure cuff [Cardiac Monitor] : cardiac monitor [Pulse Oximeter] : pulse oximeter [Versed ___ mg IV] : Versed [unfilled] ~Umg intravenously [Propofol ___ mg IV] : Propofol [unfilled] ~Umg intravenously [Sedation Clearance] : the patient was cleared for moderate sedation [2] : 2 [Time started: ___] : Start Time:  [unfilled] [Time Completed: ___] : Completion Time:  [unfilled] [Performed By: ___] : Performed by:  [place] [Hiatal Hernia] : hiatal hernia [Erythema] : erythema [Normal] : Normal [Sent to Pathology] : was sent to pathology for analysis [Tolerated Well] : the patient tolerated the procedure well [Vital Signs Stable] : the vital signs were stable [de-identified] : [id number] [de-identified] : Z line was regular [de-identified] : s/p cold biopsy [de-identified] : s/p cold biopsy [de-identified] : s/p cold biopsy [de-identified] : s/p cold biopsy [de-identified] : Duodenum; Gastric

## 2019-09-06 NOTE — PROCEDURE
[With Biopsy] : with biopsy [Dyspepsia] : dyspepsia [Allergies Reviewed] : allergies reviewed. [Procedure Explained] : The procedure was explained [Benefits] : benefits [Risks] : Risks [Alternatives] : alternatives [Consent Obtained] : written consent was obtained prior to the procedure and is detailed in the patient's record [Patient] : the patient [Automated Blood Pressure Cuff] : automated blood pressure cuff [Cardiac Monitor] : cardiac monitor [Pulse Oximeter] : pulse oximeter [Versed ___ mg IV] : Versed [unfilled] ~Umg intravenously [Propofol ___ mg IV] : Propofol [unfilled] ~Umg intravenously [2] : 2 [Sedation Clearance] : the patient was cleared for moderate sedation [Time Completed: ___] : Completion Time:  [unfilled] [Time started: ___] : Start Time:  [unfilled] [Performed By: ___] : Performed by:  [place] [Hiatal Hernia] : hiatal hernia [Erythema] : erythema [Normal] : Normal [Sent to Pathology] : was sent to pathology for analysis [Tolerated Well] : the patient tolerated the procedure well [Vital Signs Stable] : the vital signs were stable [de-identified] : [id number] [de-identified] : Z line was regular [de-identified] : s/p cold biopsy [de-identified] : s/p cold biopsy [de-identified] : s/p cold biopsy [de-identified] : s/p cold biopsy [de-identified] : Duodenum; Gastric

## 2019-09-06 NOTE — ASSESSMENT
[FreeTextEntry1] : IMPRESSION:\par Small hiatal hernia\par Gastritis\par \par RECOMMENDATIONS:\par Treat for H. pylori if positive\par Try carafate 1 gram po bid\par

## 2019-09-06 NOTE — PHYSICAL EXAM
[General Appearance - Alert] : alert [General Appearance - In No Acute Distress] : in no acute distress [Sclera] : the sclera and conjunctiva were normal [PERRL With Normal Accommodation] : pupils were equal in size, round, and reactive to light [Extraocular Movements] : extraocular movements were intact [Outer Ear] : the ears and nose were normal in appearance [Oropharynx] : the oropharynx was normal [Neck Appearance] : the appearance of the neck was normal [Neck Cervical Mass (___cm)] : no neck mass was observed [Jugular Venous Distention Increased] : there was no jugular-venous distention [Thyroid Diffuse Enlargement] : the thyroid was not enlarged [Thyroid Nodule] : there were no palpable thyroid nodules [Auscultation Breath Sounds / Voice Sounds] : lungs were clear to auscultation bilaterally [Heart Rate And Rhythm] : heart rate was normal and rhythm regular [Heart Sounds] : normal S1 and S2 [Murmurs] : no murmurs [Heart Sounds Pericardial Friction Rub] : no pericardial rub [Heart Sounds Gallop] : no gallops [Edema] : there was no peripheral edema [Full Pulse] : the pedal pulses are present [Bowel Sounds] : normal bowel sounds [Abdomen Soft] : soft [Abdomen Tenderness] : non-tender [Abdomen Mass (___ Cm)] : no abdominal mass palpated [Cervical Lymph Nodes Enlarged Anterior Bilaterally] : anterior cervical [Cervical Lymph Nodes Enlarged Posterior Bilaterally] : posterior cervical [No CVA Tenderness] : no ~M costovertebral angle tenderness [Supraclavicular Lymph Nodes Enlarged Bilaterally] : supraclavicular [Skin Color & Pigmentation] : normal skin color and pigmentation [No Spinal Tenderness] : no spinal tenderness [] : no rash [No Focal Deficits] : no focal deficits [Skin Turgor] : normal skin turgor [Impaired Insight] : insight and judgment were intact [Affect] : the affect was normal [Oriented To Time, Place, And Person] : oriented to person, place, and time

## 2019-09-06 NOTE — REASON FOR VISIT
[Procedure: _________] : a [unfilled] procedure visit [Endoscopy] : an endoscopy [Family Member] : family member

## 2019-09-11 LAB — SURGICAL PATHOLOGY STUDY: SIGNIFICANT CHANGE UP

## 2019-09-14 ENCOUNTER — MOBILE ON CALL (OUTPATIENT)
Age: 59
End: 2019-09-14

## 2019-09-16 ENCOUNTER — OTHER (OUTPATIENT)
Age: 59
End: 2019-09-16

## 2019-10-25 ENCOUNTER — APPOINTMENT (OUTPATIENT)
Dept: GASTROENTEROLOGY | Facility: CLINIC | Age: 59
End: 2019-10-25
Payer: COMMERCIAL

## 2019-10-25 VITALS
RESPIRATION RATE: 15 BRPM | SYSTOLIC BLOOD PRESSURE: 131 MMHG | WEIGHT: 198 LBS | HEIGHT: 62 IN | HEART RATE: 75 BPM | OXYGEN SATURATION: 98 % | DIASTOLIC BLOOD PRESSURE: 91 MMHG | BODY MASS INDEX: 36.44 KG/M2

## 2019-10-25 PROCEDURE — 99214 OFFICE O/P EST MOD 30 MIN: CPT

## 2019-10-25 NOTE — PHYSICAL EXAM
[General Appearance - In No Acute Distress] : in no acute distress [General Appearance - Alert] : alert [PERRL With Normal Accommodation] : pupils were equal in size, round, and reactive to light [Sclera] : the sclera and conjunctiva were normal [Extraocular Movements] : extraocular movements were intact [Outer Ear] : the ears and nose were normal in appearance [Oropharynx] : the oropharynx was normal [Neck Appearance] : the appearance of the neck was normal [Neck Cervical Mass (___cm)] : no neck mass was observed [Jugular Venous Distention Increased] : there was no jugular-venous distention [Thyroid Diffuse Enlargement] : the thyroid was not enlarged [Thyroid Nodule] : there were no palpable thyroid nodules [Auscultation Breath Sounds / Voice Sounds] : lungs were clear to auscultation bilaterally [Heart Rate And Rhythm] : heart rate was normal and rhythm regular [Heart Sounds] : normal S1 and S2 [Heart Sounds Gallop] : no gallops [Murmurs] : no murmurs [Full Pulse] : the pedal pulses are present [Heart Sounds Pericardial Friction Rub] : no pericardial rub [Edema] : there was no peripheral edema [Abdomen Soft] : soft [Bowel Sounds] : normal bowel sounds [Abdomen Tenderness] : non-tender [Cervical Lymph Nodes Enlarged Posterior Bilaterally] : posterior cervical [Abdomen Mass (___ Cm)] : no abdominal mass palpated [Cervical Lymph Nodes Enlarged Anterior Bilaterally] : anterior cervical [Supraclavicular Lymph Nodes Enlarged Bilaterally] : supraclavicular [No CVA Tenderness] : no ~M costovertebral angle tenderness [No Spinal Tenderness] : no spinal tenderness [Skin Turgor] : normal skin turgor [Skin Color & Pigmentation] : normal skin color and pigmentation [No Focal Deficits] : no focal deficits [Oriented To Time, Place, And Person] : oriented to person, place, and time [] : no rash [Impaired Insight] : insight and judgment were intact [Affect] : the affect was normal

## 2019-10-31 ENCOUNTER — RX CHANGE (OUTPATIENT)
Age: 59
End: 2019-10-31

## 2019-11-14 ENCOUNTER — FORM ENCOUNTER (OUTPATIENT)
Age: 59
End: 2019-11-14

## 2019-11-15 ENCOUNTER — OUTPATIENT (OUTPATIENT)
Dept: OUTPATIENT SERVICES | Facility: HOSPITAL | Age: 59
LOS: 1 days | End: 2019-11-15
Payer: COMMERCIAL

## 2019-11-15 ENCOUNTER — APPOINTMENT (OUTPATIENT)
Dept: CT IMAGING | Facility: CLINIC | Age: 59
End: 2019-11-15
Payer: COMMERCIAL

## 2019-11-15 ENCOUNTER — OTHER (OUTPATIENT)
Age: 59
End: 2019-11-15

## 2019-11-15 DIAGNOSIS — Z00.00 ENCOUNTER FOR GENERAL ADULT MEDICAL EXAMINATION WITHOUT ABNORMAL FINDINGS: ICD-10-CM

## 2019-11-15 DIAGNOSIS — R10.9 UNSPECIFIED ABDOMINAL PAIN: ICD-10-CM

## 2019-11-15 DIAGNOSIS — Z90.49 ACQUIRED ABSENCE OF OTHER SPECIFIED PARTS OF DIGESTIVE TRACT: Chronic | ICD-10-CM

## 2019-11-15 PROCEDURE — 74177 CT ABD & PELVIS W/CONTRAST: CPT

## 2019-11-15 PROCEDURE — 82565 ASSAY OF CREATININE: CPT

## 2019-11-15 PROCEDURE — 74177 CT ABD & PELVIS W/CONTRAST: CPT | Mod: 26

## 2019-12-13 ENCOUNTER — APPOINTMENT (OUTPATIENT)
Dept: GASTROENTEROLOGY | Facility: CLINIC | Age: 59
End: 2019-12-13
Payer: COMMERCIAL

## 2019-12-13 VITALS
RESPIRATION RATE: 16 BRPM | BODY MASS INDEX: 37.36 KG/M2 | DIASTOLIC BLOOD PRESSURE: 88 MMHG | OXYGEN SATURATION: 98 % | SYSTOLIC BLOOD PRESSURE: 128 MMHG | HEIGHT: 62 IN | HEART RATE: 81 BPM | WEIGHT: 203 LBS

## 2019-12-13 DIAGNOSIS — K59.09 OTHER CONSTIPATION: ICD-10-CM

## 2019-12-13 DIAGNOSIS — R10.9 UNSPECIFIED ABDOMINAL PAIN: ICD-10-CM

## 2019-12-13 PROCEDURE — 99214 OFFICE O/P EST MOD 30 MIN: CPT

## 2019-12-13 RX ORDER — SUCRALFATE 1 G/1
1 TABLET ORAL
Qty: 60 | Refills: 3 | Status: COMPLETED | COMMUNITY
Start: 2019-09-12 | End: 2019-12-13

## 2019-12-13 NOTE — HISTORY OF PRESENT ILLNESS
[de-identified] : The patient arrived for followup.She has been evaluated for significant acid reflux, burping, abdominal discomfort. EGD had shown discussed gastritis. CT abdomen revealed no evidence of any difficulty. She feels better after passing a bowel movement. She is taking Metamucil capsules on a daily basis. She is taking omeprazole 40 mg on a daily basis. She had tried Carafate which did not help her. She passes bowel movement every other day

## 2019-12-13 NOTE — ASSESSMENT
[FreeTextEntry1] : At this time, I am recommending to increase PPI to b.i.d. She would take omeprazole in the morning and then at 3 PM. I am recommending to consider MiraLax to have a bowel movement on a regular basis.Then followup in 2 months.\par \par Vinay Escobedo MD\par Gastroenterology \par \par

## 2019-12-13 NOTE — PHYSICAL EXAM
[General Appearance - Alert] : alert [General Appearance - In No Acute Distress] : in no acute distress [Sclera] : the sclera and conjunctiva were normal [Extraocular Movements] : extraocular movements were intact [PERRL With Normal Accommodation] : pupils were equal in size, round, and reactive to light [Outer Ear] : the ears and nose were normal in appearance [Oropharynx] : the oropharynx was normal [Neck Appearance] : the appearance of the neck was normal [Jugular Venous Distention Increased] : there was no jugular-venous distention [Neck Cervical Mass (___cm)] : no neck mass was observed [Thyroid Diffuse Enlargement] : the thyroid was not enlarged [Thyroid Nodule] : there were no palpable thyroid nodules [Auscultation Breath Sounds / Voice Sounds] : lungs were clear to auscultation bilaterally [Heart Rate And Rhythm] : heart rate was normal and rhythm regular [Heart Sounds] : normal S1 and S2 [Heart Sounds Gallop] : no gallops [Murmurs] : no murmurs [Heart Sounds Pericardial Friction Rub] : no pericardial rub [Full Pulse] : the pedal pulses are present [Edema] : there was no peripheral edema [Bowel Sounds] : normal bowel sounds [Abdomen Tenderness] : non-tender [Abdomen Soft] : soft [Abdomen Mass (___ Cm)] : no abdominal mass palpated [Cervical Lymph Nodes Enlarged Posterior Bilaterally] : posterior cervical [Supraclavicular Lymph Nodes Enlarged Bilaterally] : supraclavicular [Cervical Lymph Nodes Enlarged Anterior Bilaterally] : anterior cervical [No CVA Tenderness] : no ~M costovertebral angle tenderness [No Spinal Tenderness] : no spinal tenderness [Skin Color & Pigmentation] : normal skin color and pigmentation [Skin Turgor] : normal skin turgor [] : no rash [Oriented To Time, Place, And Person] : oriented to person, place, and time [No Focal Deficits] : no focal deficits [Impaired Insight] : insight and judgment were intact [Affect] : the affect was normal

## 2019-12-24 NOTE — ASSESSMENT
[FreeTextEntry1] : At this time, I am recommending to repeat the labs and obtain a CT abdomen.If the CT abdomen is unremarkable,we will perform a colonoscopy. If the liver tests are elevated, we will perform liver disease workup.\par \par Vinay Escobedo MD\par Gastroenterology \par \par

## 2019-12-24 NOTE — HISTORY OF PRESENT ILLNESS
[de-identified] : The patient arrived for evaluation for abdominal pain. She has reflux which got worse and after cholecystectomy.She is still complaining of abdominal pain which has not really helped by PPI and Carafate. She is complaining of constipation.Her last colonoscopy was in 2016 which Showed diverticulosis and hemorrhoids and ten-year followup was recommended.Her recent labs showed alkaline phosphatase to be elevated to 145

## 2020-01-12 ENCOUNTER — OUTPATIENT (OUTPATIENT)
Dept: OUTPATIENT SERVICES | Facility: HOSPITAL | Age: 60
LOS: 1 days | End: 2020-01-12
Payer: COMMERCIAL

## 2020-01-12 ENCOUNTER — APPOINTMENT (OUTPATIENT)
Dept: ULTRASOUND IMAGING | Facility: CLINIC | Age: 60
End: 2020-01-12
Payer: COMMERCIAL

## 2020-01-12 DIAGNOSIS — Z90.49 ACQUIRED ABSENCE OF OTHER SPECIFIED PARTS OF DIGESTIVE TRACT: Chronic | ICD-10-CM

## 2020-01-12 DIAGNOSIS — Z00.00 ENCOUNTER FOR GENERAL ADULT MEDICAL EXAMINATION WITHOUT ABNORMAL FINDINGS: ICD-10-CM

## 2020-01-12 PROCEDURE — 76830 TRANSVAGINAL US NON-OB: CPT | Mod: 26

## 2020-01-12 PROCEDURE — 76830 TRANSVAGINAL US NON-OB: CPT

## 2020-01-17 ENCOUNTER — RX CHANGE (OUTPATIENT)
Age: 60
End: 2020-01-17

## 2020-02-14 ENCOUNTER — APPOINTMENT (OUTPATIENT)
Dept: GASTROENTEROLOGY | Facility: CLINIC | Age: 60
End: 2020-02-14

## 2020-06-10 ENCOUNTER — OUTPATIENT (OUTPATIENT)
Dept: OUTPATIENT SERVICES | Facility: HOSPITAL | Age: 60
LOS: 1 days | End: 2020-06-10

## 2020-06-10 ENCOUNTER — APPOINTMENT (OUTPATIENT)
Dept: ULTRASOUND IMAGING | Facility: CLINIC | Age: 60
End: 2020-06-10
Payer: COMMERCIAL

## 2020-06-10 DIAGNOSIS — Z90.49 ACQUIRED ABSENCE OF OTHER SPECIFIED PARTS OF DIGESTIVE TRACT: Chronic | ICD-10-CM

## 2020-06-10 DIAGNOSIS — Z00.8 ENCOUNTER FOR OTHER GENERAL EXAMINATION: ICD-10-CM

## 2020-06-10 PROCEDURE — 76536 US EXAM OF HEAD AND NECK: CPT | Mod: 26

## 2020-06-15 ENCOUNTER — RX CHANGE (OUTPATIENT)
Age: 60
End: 2020-06-15

## 2020-08-04 ENCOUNTER — RX RENEWAL (OUTPATIENT)
Age: 60
End: 2020-08-04

## 2021-01-26 NOTE — ED ADULT NURSE NOTE - CHPI ED SYMPTOMS POS
vomiting/PAIN Zygomaticofacial Flap Text: Given the location of the defect, shape of the defect and the proximity to free margins a zygomaticofacial flap was deemed most appropriate for repair.  Using a sterile surgical marker, the appropriate flap was drawn incorporating the defect and placing the expected incisions within the relaxed skin tension lines where possible. The area thus outlined was incised deep to adipose tissue with a #15 scalpel blade with preservation of a vascular pedicle.  The skin margins were undermined to an appropriate distance in all directions utilizing iris scissors.  The flap was then placed into the defect and anchored with interrupted buried subcutaneous sutures.

## 2021-02-02 ENCOUNTER — RX RENEWAL (OUTPATIENT)
Age: 61
End: 2021-02-02

## 2021-03-27 ENCOUNTER — RX RENEWAL (OUTPATIENT)
Age: 61
End: 2021-03-27

## 2021-09-26 ENCOUNTER — RX RENEWAL (OUTPATIENT)
Age: 61
End: 2021-09-26

## 2021-12-13 ENCOUNTER — APPOINTMENT (OUTPATIENT)
Dept: GASTROENTEROLOGY | Facility: CLINIC | Age: 61
End: 2021-12-13
Payer: COMMERCIAL

## 2021-12-13 VITALS
HEIGHT: 62 IN | DIASTOLIC BLOOD PRESSURE: 80 MMHG | WEIGHT: 219 LBS | TEMPERATURE: 98 F | HEART RATE: 78 BPM | BODY MASS INDEX: 40.3 KG/M2 | SYSTOLIC BLOOD PRESSURE: 120 MMHG | OXYGEN SATURATION: 98 % | RESPIRATION RATE: 16 BRPM

## 2021-12-13 DIAGNOSIS — K21.9 GASTRO-ESOPHAGEAL REFLUX DISEASE W/OUT ESOPHAGITIS: ICD-10-CM

## 2021-12-13 DIAGNOSIS — R05.9 COUGH, UNSPECIFIED: ICD-10-CM

## 2021-12-13 DIAGNOSIS — R10.9 UNSPECIFIED ABDOMINAL PAIN: ICD-10-CM

## 2021-12-13 PROCEDURE — 99072 ADDL SUPL MATRL&STAF TM PHE: CPT

## 2021-12-13 PROCEDURE — 99213 OFFICE O/P EST LOW 20 MIN: CPT

## 2021-12-13 RX ORDER — OMEPRAZOLE 40 MG/1
40 CAPSULE, DELAYED RELEASE ORAL
Qty: 90 | Refills: 0 | Status: DISCONTINUED | COMMUNITY
Start: 2016-12-31 | End: 2021-12-13

## 2021-12-13 RX ORDER — OMEPRAZOLE 20 MG/1
20 CAPSULE, DELAYED RELEASE ORAL
Qty: 30 | Refills: 11 | Status: DISCONTINUED | COMMUNITY
Start: 2019-12-13 | End: 2021-12-13

## 2021-12-13 RX ORDER — OXCARBAZEPINE 150 MG/1
150 TABLET, FILM COATED ORAL
Qty: 180 | Refills: 0 | Status: ACTIVE | COMMUNITY
Start: 2021-09-17

## 2021-12-13 RX ORDER — CIPROFLOXACIN AND DEXAMETHASONE 3; 1 MG/ML; MG/ML
0.3-0.1 SUSPENSION/ DROPS AURICULAR (OTIC)
Qty: 8 | Refills: 0 | Status: ACTIVE | COMMUNITY
Start: 2021-08-30

## 2021-12-13 RX ORDER — GABAPENTIN 300 MG/1
300 CAPSULE ORAL
Qty: 180 | Refills: 0 | Status: ACTIVE | COMMUNITY
Start: 2021-09-26

## 2021-12-13 NOTE — ASSESSMENT
[FreeTextEntry1] : Patient is a 61 year female, with PMH of trigeminal neuralgia (plan for neurosurgery next month), chronic GERD, HLD, who presents for evaluation of chronic GERD with associated nausea, dry cough, itching in her throat. She was taking Omeprazole 40mg PO QD but she increased it on her own to BID about 2 weeks ago - she did not notice a difference in her symptoms. \par \par Difficult to determine if this is 2/2 poorly controlled GERD vs seasonal allergies vs other etiology?\par \par Will continue with Omeprazole 40mg PO QD and add Carafate 1gm PO QID, prn\par \par If no relief, could switch Carafate to Famotidine. If no relief, likely not 2/2 GERD. Patient will consider discussing symptoms with PCP to consider allergy workup

## 2021-12-13 NOTE — HISTORY OF PRESENT ILLNESS
[de-identified] : Patient is a 61 year female, with PMH of trigeminal neuralgia (plan for neurosurgery next month), chronic GERD, HLD, who presents for evaluation of chronic GERD with associated nausea, dry cough, itching in her throat. She was taking Omeprazole 40mg PO QD but she increased it on her own to BID about 2 weeks ago - she did not notice a difference in her symptoms. \par \par She has a h/o hiatal hernia and gastritis - noted on EGD in 9/2019, neg HP at that time. She had been given Carafate 1gm PO BID at that time, she is unsure if she took it or not. \par \par Patient denies h/o seasonal allergies. \par \par Patient denies any significant cardiac or pulmonary conditions. \par \par Patient denies dysphagia, abdominal pain, change in BMs, rectal bleeding, nausea, vomiting, or unexplained weight loss. Billing Type: Third-Party Bill Bill For Surgical Tray: no

## 2022-01-18 ENCOUNTER — TRANSCRIPTION ENCOUNTER (OUTPATIENT)
Age: 62
End: 2022-01-18

## 2022-01-18 VITALS
SYSTOLIC BLOOD PRESSURE: 161 MMHG | DIASTOLIC BLOOD PRESSURE: 89 MMHG | OXYGEN SATURATION: 98 % | WEIGHT: 201.5 LBS | HEART RATE: 87 BPM | RESPIRATION RATE: 18 BRPM | TEMPERATURE: 97 F | HEIGHT: 63 IN

## 2022-01-18 RX ORDER — OXCARBAZEPINE 300 MG/1
1 TABLET, FILM COATED ORAL
Qty: 0 | Refills: 0 | DISCHARGE

## 2022-01-18 RX ORDER — GABAPENTIN 400 MG/1
1 CAPSULE ORAL
Qty: 0 | Refills: 0 | DISCHARGE

## 2022-01-18 RX ORDER — INFLUENZA VIRUS VACCINE 15; 15; 15; 15 UG/.5ML; UG/.5ML; UG/.5ML; UG/.5ML
0.5 SUSPENSION INTRAMUSCULAR ONCE
Refills: 0 | Status: DISCONTINUED | OUTPATIENT
Start: 2022-01-19 | End: 2022-01-25

## 2022-01-18 NOTE — PATIENT PROFILE ADULT - FALL HARM RISK - UNIVERSAL INTERVENTIONS
Bed in lowest position, wheels locked, appropriate side rails in place/Call bell, personal items and telephone in reach/Instruct patient to call for assistance before getting out of bed or chair/Non-slip footwear when patient is out of bed/[place] to call system/Physically safe environment - no spills, clutter or unnecessary equipment/Purposeful Proactive Rounding/Room/bathroom lighting operational, light cord in reach

## 2022-01-18 NOTE — PRE-OP CHECKLIST - SELECT TESTS ORDERED
COVID-19 CBC/CMP/Type and Screen/EKG/POCT Blood Glucose/COVID-19 126/CBC/CMP/Type and Screen/EKG/POCT Blood Glucose/COVID-19

## 2022-01-19 ENCOUNTER — INPATIENT (INPATIENT)
Facility: HOSPITAL | Age: 62
LOS: 5 days | Discharge: HOME HEALTH | DRG: 26 | End: 2022-01-25
Attending: NEUROLOGICAL SURGERY | Admitting: NEUROLOGICAL SURGERY
Payer: COMMERCIAL

## 2022-01-19 DIAGNOSIS — E11.9 TYPE 2 DIABETES MELLITUS WITHOUT COMPLICATIONS: ICD-10-CM

## 2022-01-19 DIAGNOSIS — G50.0 TRIGEMINAL NEURALGIA: ICD-10-CM

## 2022-01-19 DIAGNOSIS — D69.6 THROMBOCYTOPENIA, UNSPECIFIED: ICD-10-CM

## 2022-01-19 DIAGNOSIS — Z98.890 OTHER SPECIFIED POSTPROCEDURAL STATES: Chronic | ICD-10-CM

## 2022-01-19 DIAGNOSIS — Z90.49 ACQUIRED ABSENCE OF OTHER SPECIFIED PARTS OF DIGESTIVE TRACT: Chronic | ICD-10-CM

## 2022-01-19 DIAGNOSIS — E78.5 HYPERLIPIDEMIA, UNSPECIFIED: ICD-10-CM

## 2022-01-19 DIAGNOSIS — K44.9 DIAPHRAGMATIC HERNIA WITHOUT OBSTRUCTION OR GANGRENE: ICD-10-CM

## 2022-01-19 DIAGNOSIS — Z92.3 PERSONAL HISTORY OF IRRADIATION: ICD-10-CM

## 2022-01-19 DIAGNOSIS — H55.00 UNSPECIFIED NYSTAGMUS: ICD-10-CM

## 2022-01-19 DIAGNOSIS — G97.31 INTRAOPERATIVE HEMORRHAGE AND HEMATOMA OF A NERVOUS SYSTEM ORGAN OR STRUCTURE COMPLICATING A NERVOUS SYSTEM PROCEDURE: ICD-10-CM

## 2022-01-19 DIAGNOSIS — E04.9 NONTOXIC GOITER, UNSPECIFIED: ICD-10-CM

## 2022-01-19 DIAGNOSIS — Z92.3 PERSONAL HISTORY OF IRRADIATION: Chronic | ICD-10-CM
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
## 2022-01-19 DIAGNOSIS — G47.33 OBSTRUCTIVE SLEEP APNEA (ADULT) (PEDIATRIC): ICD-10-CM

## 2022-01-19 DIAGNOSIS — E87.1 HYPO-OSMOLALITY AND HYPONATREMIA: ICD-10-CM

## 2022-01-19 LAB
ALBUMIN SERPL ELPH-MCNC: 4.2 G/DL — SIGNIFICANT CHANGE UP (ref 3.3–5)
ALP SERPL-CCNC: 133 U/L — HIGH (ref 40–120)
ALT FLD-CCNC: 43 U/L — SIGNIFICANT CHANGE UP (ref 10–45)
ANION GAP SERPL CALC-SCNC: 13 MMOL/L — SIGNIFICANT CHANGE UP (ref 5–17)
AST SERPL-CCNC: 41 U/L — HIGH (ref 10–40)
BASOPHILS # BLD AUTO: 0.01 K/UL — SIGNIFICANT CHANGE UP (ref 0–0.2)
BASOPHILS NFR BLD AUTO: 0.1 % — SIGNIFICANT CHANGE UP (ref 0–2)
BILIRUB SERPL-MCNC: 0.2 MG/DL — SIGNIFICANT CHANGE UP (ref 0.2–1.2)
BLD GP AB SCN SERPL QL: NEGATIVE — SIGNIFICANT CHANGE UP
BUN SERPL-MCNC: 9 MG/DL — SIGNIFICANT CHANGE UP (ref 7–23)
CALCIUM SERPL-MCNC: 8.9 MG/DL — SIGNIFICANT CHANGE UP (ref 8.4–10.5)
CHLORIDE SERPL-SCNC: 103 MMOL/L — SIGNIFICANT CHANGE UP (ref 96–108)
CO2 SERPL-SCNC: 22 MMOL/L — SIGNIFICANT CHANGE UP (ref 22–31)
CREAT SERPL-MCNC: 0.57 MG/DL — SIGNIFICANT CHANGE UP (ref 0.5–1.3)
EOSINOPHIL # BLD AUTO: 0 K/UL — SIGNIFICANT CHANGE UP (ref 0–0.5)
EOSINOPHIL NFR BLD AUTO: 0 % — SIGNIFICANT CHANGE UP (ref 0–6)
GLUCOSE BLDC GLUCOMTR-MCNC: 126 MG/DL — HIGH (ref 70–99)
GLUCOSE BLDC GLUCOMTR-MCNC: 181 MG/DL — HIGH (ref 70–99)
GLUCOSE BLDC GLUCOMTR-MCNC: 199 MG/DL — HIGH (ref 70–99)
GLUCOSE SERPL-MCNC: 207 MG/DL — HIGH (ref 70–99)
HCT VFR BLD CALC: 38.1 % — SIGNIFICANT CHANGE UP (ref 34.5–45)
HGB BLD-MCNC: 13.1 G/DL — SIGNIFICANT CHANGE UP (ref 11.5–15.5)
IMM GRANULOCYTES NFR BLD AUTO: 0.8 % — SIGNIFICANT CHANGE UP (ref 0–1.5)
LYMPHOCYTES # BLD AUTO: 0.7 K/UL — LOW (ref 1–3.3)
LYMPHOCYTES # BLD AUTO: 9 % — LOW (ref 13–44)
MCHC RBC-ENTMCNC: 31 PG — SIGNIFICANT CHANGE UP (ref 27–34)
MCHC RBC-ENTMCNC: 34.4 GM/DL — SIGNIFICANT CHANGE UP (ref 32–36)
MCV RBC AUTO: 90.3 FL — SIGNIFICANT CHANGE UP (ref 80–100)
MONOCYTES # BLD AUTO: 0.09 K/UL — SIGNIFICANT CHANGE UP (ref 0–0.9)
MONOCYTES NFR BLD AUTO: 1.2 % — LOW (ref 2–14)
NEUTROPHILS # BLD AUTO: 6.88 K/UL — SIGNIFICANT CHANGE UP (ref 1.8–7.4)
NEUTROPHILS NFR BLD AUTO: 88.9 % — HIGH (ref 43–77)
NRBC # BLD: 0 /100 WBCS — SIGNIFICANT CHANGE UP (ref 0–0)
PLATELET # BLD AUTO: 164 K/UL — SIGNIFICANT CHANGE UP (ref 150–400)
POTASSIUM SERPL-MCNC: 3.6 MMOL/L — SIGNIFICANT CHANGE UP (ref 3.5–5.3)
POTASSIUM SERPL-SCNC: 3.6 MMOL/L — SIGNIFICANT CHANGE UP (ref 3.5–5.3)
PROT SERPL-MCNC: 7.1 G/DL — SIGNIFICANT CHANGE UP (ref 6–8.3)
RBC # BLD: 4.22 M/UL — SIGNIFICANT CHANGE UP (ref 3.8–5.2)
RBC # FLD: 13 % — SIGNIFICANT CHANGE UP (ref 10.3–14.5)
RH IG SCN BLD-IMP: NEGATIVE — SIGNIFICANT CHANGE UP
SODIUM SERPL-SCNC: 138 MMOL/L — SIGNIFICANT CHANGE UP (ref 135–145)
WBC # BLD: 7.74 K/UL — SIGNIFICANT CHANGE UP (ref 3.8–10.5)
WBC # FLD AUTO: 7.74 K/UL — SIGNIFICANT CHANGE UP (ref 3.8–10.5)

## 2022-01-19 PROCEDURE — 70450 CT HEAD/BRAIN W/O DYE: CPT | Mod: 26

## 2022-01-19 PROCEDURE — 99024 POSTOP FOLLOW-UP VISIT: CPT

## 2022-01-19 DEVICE — SURGIFOAM POWDER 1 GRAM: Type: IMPLANTABLE DEVICE | Status: FUNCTIONAL

## 2022-01-19 DEVICE — SCREW SLF-DRILL 1.5X4MM: Type: IMPLANTABLE DEVICE | Status: FUNCTIONAL

## 2022-01-19 DEVICE — CATH LUMBAR CLOSED TIP: Type: IMPLANTABLE DEVICE | Status: FUNCTIONAL

## 2022-01-19 DEVICE — FELT PTFE 2 X 2": Type: IMPLANTABLE DEVICE | Status: FUNCTIONAL

## 2022-01-19 DEVICE — SURGIFOAM PAD 8CM X 12.5CM X 10MM (100): Type: IMPLANTABLE DEVICE | Status: FUNCTIONAL

## 2022-01-19 DEVICE — SURGICEL 4 X 8": Type: IMPLANTABLE DEVICE | Status: FUNCTIONAL

## 2022-01-19 DEVICE — SURGIFLO HEMOSTATIC MATRIX KIT: Type: IMPLANTABLE DEVICE | Status: FUNCTIONAL

## 2022-01-19 DEVICE — PLATE RETROSIGMOID MALLEABLE MED: Type: IMPLANTABLE DEVICE | Status: FUNCTIONAL

## 2022-01-19 DEVICE — DURASEAL: Type: IMPLANTABLE DEVICE | Status: FUNCTIONAL

## 2022-01-19 RX ORDER — PROCHLORPERAZINE MALEATE 5 MG
10 TABLET ORAL EVERY 6 HOURS
Refills: 0 | Status: DISCONTINUED | OUTPATIENT
Start: 2022-01-19 | End: 2022-01-25

## 2022-01-19 RX ORDER — FENTANYL CITRATE 50 UG/ML
12.5 INJECTION INTRAVENOUS ONCE
Refills: 0 | Status: DISCONTINUED | OUTPATIENT
Start: 2022-01-19 | End: 2022-01-19

## 2022-01-19 RX ORDER — GLUCAGON INJECTION, SOLUTION 0.5 MG/.1ML
1 INJECTION, SOLUTION SUBCUTANEOUS ONCE
Refills: 0 | Status: DISCONTINUED | OUTPATIENT
Start: 2022-01-19 | End: 2022-01-23

## 2022-01-19 RX ORDER — PANTOPRAZOLE SODIUM 20 MG/1
40 TABLET, DELAYED RELEASE ORAL DAILY
Refills: 0 | Status: DISCONTINUED | OUTPATIENT
Start: 2022-01-19 | End: 2022-01-23

## 2022-01-19 RX ORDER — DEXAMETHASONE 0.5 MG/5ML
2 ELIXIR ORAL EVERY 6 HOURS
Refills: 0 | Status: DISCONTINUED | OUTPATIENT
Start: 2022-01-19 | End: 2022-01-21

## 2022-01-19 RX ORDER — OMEPRAZOLE 10 MG/1
1 CAPSULE, DELAYED RELEASE ORAL
Qty: 0 | Refills: 0 | DISCHARGE

## 2022-01-19 RX ORDER — SODIUM CHLORIDE 9 MG/ML
1000 INJECTION, SOLUTION INTRAVENOUS
Refills: 0 | Status: DISCONTINUED | OUTPATIENT
Start: 2022-01-19 | End: 2022-01-23

## 2022-01-19 RX ORDER — ROSUVASTATIN CALCIUM 5 MG/1
1 TABLET ORAL
Qty: 0 | Refills: 0 | DISCHARGE

## 2022-01-19 RX ORDER — INSULIN LISPRO 100/ML
VIAL (ML) SUBCUTANEOUS
Refills: 0 | Status: DISCONTINUED | OUTPATIENT
Start: 2022-01-19 | End: 2022-01-23

## 2022-01-19 RX ORDER — ACETAMINOPHEN 500 MG
1000 TABLET ORAL ONCE
Refills: 0 | Status: COMPLETED | OUTPATIENT
Start: 2022-01-19 | End: 2022-01-19

## 2022-01-19 RX ORDER — LABETALOL HCL 100 MG
10 TABLET ORAL ONCE
Refills: 0 | Status: COMPLETED | OUTPATIENT
Start: 2022-01-19 | End: 2022-01-19

## 2022-01-19 RX ORDER — SUCRALFATE 1 G
1 TABLET ORAL
Refills: 0 | Status: DISCONTINUED | OUTPATIENT
Start: 2022-01-19 | End: 2022-01-25

## 2022-01-19 RX ORDER — GABAPENTIN 400 MG/1
1 CAPSULE ORAL
Qty: 0 | Refills: 0 | DISCHARGE

## 2022-01-19 RX ORDER — SCOPALAMINE 1 MG/3D
1 PATCH, EXTENDED RELEASE TRANSDERMAL
Refills: 0 | Status: DISCONTINUED | OUTPATIENT
Start: 2022-01-19 | End: 2022-01-25

## 2022-01-19 RX ORDER — CHLORHEXIDINE GLUCONATE 213 G/1000ML
1 SOLUTION TOPICAL EVERY 12 HOURS
Refills: 0 | Status: DISCONTINUED | OUTPATIENT
Start: 2022-01-19 | End: 2022-01-19

## 2022-01-19 RX ORDER — GABAPENTIN 400 MG/1
600 CAPSULE ORAL AT BEDTIME
Refills: 0 | Status: DISCONTINUED | OUTPATIENT
Start: 2022-01-19 | End: 2022-01-25

## 2022-01-19 RX ORDER — METOCLOPRAMIDE HCL 10 MG
10 TABLET ORAL ONCE
Refills: 0 | Status: DISCONTINUED | OUTPATIENT
Start: 2022-01-19 | End: 2022-01-19

## 2022-01-19 RX ORDER — SODIUM CHLORIDE 9 MG/ML
1000 INJECTION INTRAMUSCULAR; INTRAVENOUS; SUBCUTANEOUS
Refills: 0 | Status: DISCONTINUED | OUTPATIENT
Start: 2022-01-19 | End: 2022-01-20

## 2022-01-19 RX ORDER — ONDANSETRON 8 MG/1
4 TABLET, FILM COATED ORAL EVERY 6 HOURS
Refills: 0 | Status: DISCONTINUED | OUTPATIENT
Start: 2022-01-19 | End: 2022-01-25

## 2022-01-19 RX ORDER — SENNA PLUS 8.6 MG/1
2 TABLET ORAL AT BEDTIME
Refills: 0 | Status: DISCONTINUED | OUTPATIENT
Start: 2022-01-19 | End: 2022-01-25

## 2022-01-19 RX ORDER — ACETAMINOPHEN 500 MG
650 TABLET ORAL EVERY 6 HOURS
Refills: 0 | Status: DISCONTINUED | OUTPATIENT
Start: 2022-01-19 | End: 2022-01-25

## 2022-01-19 RX ORDER — POVIDONE-IODINE 5 %
1 AEROSOL (ML) TOPICAL ONCE
Refills: 0 | Status: DISCONTINUED | OUTPATIENT
Start: 2022-01-19 | End: 2022-01-19

## 2022-01-19 RX ORDER — HYDRALAZINE HCL 50 MG
10 TABLET ORAL
Refills: 0 | Status: DISCONTINUED | OUTPATIENT
Start: 2022-01-19 | End: 2022-01-23

## 2022-01-19 RX ORDER — DEXAMETHASONE 0.5 MG/5ML
2 ELIXIR ORAL EVERY 6 HOURS
Refills: 0 | Status: DISCONTINUED | OUTPATIENT
Start: 2022-01-19 | End: 2022-01-19

## 2022-01-19 RX ORDER — OXCARBAZEPINE 300 MG/1
150 TABLET, FILM COATED ORAL
Refills: 0 | Status: DISCONTINUED | OUTPATIENT
Start: 2022-01-19 | End: 2022-01-25

## 2022-01-19 RX ORDER — DEXTROSE 50 % IN WATER 50 %
15 SYRINGE (ML) INTRAVENOUS ONCE
Refills: 0 | Status: DISCONTINUED | OUTPATIENT
Start: 2022-01-19 | End: 2022-01-23

## 2022-01-19 RX ORDER — DEXTROSE 50 % IN WATER 50 %
25 SYRINGE (ML) INTRAVENOUS ONCE
Refills: 0 | Status: DISCONTINUED | OUTPATIENT
Start: 2022-01-19 | End: 2022-01-23

## 2022-01-19 RX ORDER — OXCARBAZEPINE 300 MG/1
1 TABLET, FILM COATED ORAL
Qty: 0 | Refills: 0 | DISCHARGE

## 2022-01-19 RX ORDER — DEXTROSE 50 % IN WATER 50 %
12.5 SYRINGE (ML) INTRAVENOUS ONCE
Refills: 0 | Status: DISCONTINUED | OUTPATIENT
Start: 2022-01-19 | End: 2022-01-23

## 2022-01-19 RX ORDER — SUCRALFATE 1 G
1 TABLET ORAL
Qty: 0 | Refills: 0 | DISCHARGE

## 2022-01-19 RX ORDER — ATORVASTATIN CALCIUM 80 MG/1
20 TABLET, FILM COATED ORAL AT BEDTIME
Refills: 0 | Status: DISCONTINUED | OUTPATIENT
Start: 2022-01-19 | End: 2022-01-25

## 2022-01-19 RX ORDER — METOCLOPRAMIDE HCL 10 MG
10 TABLET ORAL EVERY 6 HOURS
Refills: 0 | Status: DISCONTINUED | OUTPATIENT
Start: 2022-01-19 | End: 2022-01-23

## 2022-01-19 RX ORDER — CEFAZOLIN SODIUM 1 G
2000 VIAL (EA) INJECTION EVERY 8 HOURS
Refills: 0 | Status: DISCONTINUED | OUTPATIENT
Start: 2022-01-19 | End: 2022-01-20

## 2022-01-19 RX ADMIN — Medication 400 MILLIGRAM(S): at 20:47

## 2022-01-19 RX ADMIN — SCOPALAMINE 1 PATCH: 1 PATCH, EXTENDED RELEASE TRANSDERMAL at 19:00

## 2022-01-19 RX ADMIN — Medication 1000 MILLIGRAM(S): at 21:58

## 2022-01-19 RX ADMIN — FENTANYL CITRATE 12.5 MICROGRAM(S): 50 INJECTION INTRAVENOUS at 17:45

## 2022-01-19 RX ADMIN — Medication 2: at 22:07

## 2022-01-19 RX ADMIN — Medication 100 MILLIGRAM(S): at 22:05

## 2022-01-19 RX ADMIN — ONDANSETRON 4 MILLIGRAM(S): 8 TABLET, FILM COATED ORAL at 20:47

## 2022-01-19 RX ADMIN — Medication 10 MILLIGRAM(S): at 18:19

## 2022-01-19 RX ADMIN — Medication 10 MILLIGRAM(S): at 22:05

## 2022-01-19 RX ADMIN — Medication 10 MILLIGRAM(S): at 19:50

## 2022-01-19 RX ADMIN — Medication 10 MILLIGRAM(S): at 23:04

## 2022-01-19 RX ADMIN — Medication 2 MILLIGRAM(S): at 18:03

## 2022-01-19 RX ADMIN — SCOPALAMINE 1 PATCH: 1 PATCH, EXTENDED RELEASE TRANSDERMAL at 18:02

## 2022-01-19 RX ADMIN — FENTANYL CITRATE 12.5 MICROGRAM(S): 50 INJECTION INTRAVENOUS at 17:31

## 2022-01-19 RX ADMIN — Medication 10 MILLIGRAM(S): at 19:10

## 2022-01-19 NOTE — BRIEF OPERATIVE NOTE - NSICDXBRIEFPROCEDURE_GEN_ALL_CORE_FT
PROCEDURES:  Decompression, cranial nerve, microvascular 19-Jan-2022 16:06:30 RT Francisco Junior  Microvascular decompression of trigeminal nerve 19-Jan-2022 16:06:35  Francisco Junior

## 2022-01-19 NOTE — H&P ADULT - NSHPPHYSICALEXAM_GEN_ALL_CORE
PHYSICAL EXAM:  Neurological:  Awake and alert, oriented x3, NAD, coherent speech, following commands  PERRL, EOMI, +b/l horizontal nystagmus, no facial asymmetry, facial motor  strength intact, tongue midline, mild decreased sensation in right side CN5  V2/V3 region  GALVEZ x4 strength 5/5 throughout in UE and LE b/l  Sensation intact throughout in UE and LE b/l

## 2022-01-19 NOTE — H&P ADULT - NSICDXPASTSURGICALHX_GEN_ALL_CORE_FT
PAST SURGICAL HISTORY:  H/O bilateral breast reduction surgery     History of cholecystectomy     Status post gamma knife treatment for trigeminal neuralgia, 2017

## 2022-01-19 NOTE — H&P ADULT - NSICDXPASTMEDICALHX_GEN_ALL_CORE_FT
PAST MEDICAL HISTORY:  Diverticulosis     DM (diabetes mellitus) diet controlled    Hiatal hernia     High cholesterol     KORTNEY on CPAP     Thyroid goiter     Trigeminal neuralgia

## 2022-01-19 NOTE — H&P ADULT - HISTORY OF PRESENT ILLNESS
this info taken from patient's chart : " /..  61y Female with trigeminal neuralgia presenting with right sided facial pain  and numbness in right V2/V3 distribution with failed medical management, planned for   right microvascular decompression of trigeminal nerve with lumbar drain  placement."

## 2022-01-19 NOTE — PROGRESS NOTE ADULT - SUBJECTIVE AND OBJECTIVE BOX
HPI:        Hospital Course:   POD#       Vital Signs Last 24 Hrs  T(C): 36.4 (19 Jan 2022 16:35), Max: 36.4 (19 Jan 2022 16:35)  T(F): 97.6 (19 Jan 2022 16:35), Max: 97.6 (19 Jan 2022 16:35)  HR: 96 (19 Jan 2022 18:05) (86 - 96)  BP: 164/90 (19 Jan 2022 18:05) (139/88 - 167/88)  BP(mean): 122 (19 Jan 2022 18:05) (109 - 127)  RR: 21 (19 Jan 2022 18:05) (12 - 21)  SpO2: 98% (19 Jan 2022 18:05) (94% - 98%)    I&O's Detail    19 Jan 2022 07:01  -  19 Jan 2022 18:26  --------------------------------------------------------  IN:    sodium chloride 0.9%: 150 mL  Total IN: 150 mL    OUT:    Drain (mL): 10 mL    Indwelling Catheter - Urethral (mL): 75 mL  Total OUT: 85 mL    Total NET: 65 mL        I&O's Summary    19 Jan 2022 07:01  -  19 Jan 2022 18:26  --------------------------------------------------------  IN: 150 mL / OUT: 85 mL / NET: 65 mL        PHYSICAL EXAM:  Neurological:  AOx3, NAD, speech fluent  Pupils 3mm equal round, briskly reactive to light b/l  EOMs intact   Visual fields intact   CN II-XII intact   Face symmetric  No pronator drift  Finger to nose intact   EVD in place     vs.   Intubated, alert - opens eyes to voice only   Following commands   Pupils equal, round, briskly reactive to light b/l  +corneal  +gag   MAEx4     Motor exam:         [x] Upper extremity              Bi(c5)  WE(c6)  EE(c7)   FF(c8)                                                R         5/5        5/5        5/5       5/5                                               L          5/5        5/5        5/5       5/5         [x] Lower extremeity          HF(l2)   KE(l3)    TA(l4)   EHL(l5)  GS(s1)                                                 R        5/5        5/5        5/5       5/5         5/5                                               L         5/5        5/5       5/5       5/5          5/5                                                        [x] warm well perfused; capillary refill <3 seconds     Sensation: [x] intact to light touch  [] decreased:       Cardiovascular: RRR  Respiratory: Unlabored breathing on RA, lungs CTA b/l  Gastrointestinal: abd soft, NT, ND   Genitourinary: no rabago in place   Extremities: no LE edema or calf tenderness     Incision/Wound:  Staples in place, no purulent drainage, no erythema or edema     DEVICE/DRAIN DRESSING:  Dressing c/d/i    TUBES/LINES:  [] CVC  [] A-line  [] Lumbar Drain  [] Ventriculostomy  [] LEYDA  [] Rabago  [] NGT   [] Other    DIET:  [] NPO  [] Mechanical  [] Tube feeds    LABS:                        13.1   7.74  )-----------( 164      ( 19 Jan 2022 17:20 )             38.1     01-19    138  |  103  |  9   ----------------------------<  207<H>  3.6   |  22  |  0.57    Ca    8.9      19 Jan 2022 17:20    TPro  7.1  /  Alb  4.2  /  TBili  0.2  /  DBili  x   /  AST  41<H>  /  ALT  43  /  AlkPhos  133<H>  01-19            CAPILLARY BLOOD GLUCOSE      POCT Blood Glucose.: 181 mg/dL (19 Jan 2022 16:41)  POCT Blood Glucose.: 126 mg/dL (19 Jan 2022 06:48)      Drug Levels: [] N/A    CSF Analysis: [] N/A      Allergies    No Known Allergies    Intolerances        Home Medications:  gabapentin 600 mg oral tablet: 1 tab(s) orally once a day (at bedtime) (19 Jan 2022 06:29)  omeprazole 40 mg oral delayed release capsule: 1 cap(s) orally once a day (19 Jan 2022 06:29)  OXcarbazepine 150 mg oral tablet: 1 tab(s) orally 2 times a day (19 Jan 2022 06:29)  rosuvastatin 5 mg oral tablet: 1 tab(s) orally once a day (19 Jan 2022 06:29)  sucralfate 1 g oral tablet: 1 tab(s) orally 4 times a day (before meals and at bedtime) (19 Jan 2022 06:29)      MEDICATIONS:  Antibiotics:  ceFAZolin   IVPB 2000 milliGRAM(s) IV Intermittent every 8 hours    Neuro:  acetaminophen     Tablet .. 650 milliGRAM(s) Oral every 6 hours PRN  metoclopramide Injectable 10 milliGRAM(s) IV Push every 6 hours PRN  ondansetron Injectable 4 milliGRAM(s) IV Push every 6 hours PRN  prochlorperazine   Injectable 10 milliGRAM(s) IV Push every 6 hours PRN  scopolamine 1 mG/72 Hr(s) Patch 1 Patch Transdermal every 72 hours    Anticoagulation:    OTHER:  dexAMETHasone  Injectable 2 milliGRAM(s) IV Push every 6 hours  hydrALAZINE Injectable 10 milliGRAM(s) IV Push every 2 hours PRN  influenza   Vaccine 0.5 milliLiter(s) IntraMuscular once  pantoprazole  Injectable 40 milliGRAM(s) IV Push daily  senna 2 Tablet(s) Oral at bedtime    IVF:  sodium chloride 0.9%. 1000 milliLiter(s) IV Continuous <Continuous>    CULTURES:    RADIOLOGY & ADDITIONAL TESTS:      ASSESSMENT:  61y Female s/p    G50.0    Handoff    HTN (hypertension)    High cholesterol    Trigeminal neuralgia    Diverticulosis    Hiatal hernia    DM (diabetes mellitus)    Thyroid goiter    KORTNEY on CPAP    No pertinent past medical history    HTN (hypertension)    High cholesterol    Trigeminal neuralgia    Diverticulosis    Trigeminal neuralgia    Trigeminal neuralgia    Decompression, cranial nerve, microvascular    Microvascular decompression of trigeminal nerve    No significant past surgical history    History of cholecystectomy    H/O bilateral breast reduction surgery    Status post gamma knife treatment    No significant past surgical history    SysAdmin_VstLnk        PLAN:  NEURO:  -Neuro checks q1h  - ___ Decadron  -___ Keppra   -HOB elev 30 deg   -MRI in 48 hours     CARDIOVASCULAR:  -SBP goal 100-140    PULMONARY:  -IS     RENAL:  -Strict I&Os    GI:  -Reg diet   -Protonix while on steroids   -Bowel reg     HEME:  -Trend H/H     ID:  -No abx     ENDO:  -Trend blood glucose     DVT PROPHYLAXIS:  [x] Venodynes                                [x] Heparin/Lovenox    FALL RISK:  [] Low Risk                                    [] Impulsive    DISPOSITION:   -ICU status   -Full code  -PT/OT pending   -Discussed with     Assessment:  Present when checked    []  GCS  E   V  M     Heart Failure: []Acute, [] acute on chronic , []chronic  Heart Failure:  [] Diastolic (HFpEF), [] Systolic (HFrEF), []Combined (HFpEF and HFrEF), [] RHF, [] Pulm HTN, [] Other    [] ERICA, [] ATN, [] AIN, [] other  [] CKD1, [] CKD2, [] CKD 3, [] CKD 4, [] CKD 5, []ESRD    Encephalopathy: [] Metabolic, [] Hepatic, [] toxic, [] Neurological, [] Other    Abnormal Nurtitional Status: [] malnurtition (see nutrition note), [ ]underweight: BMI < 19, [] morbid obesity: BMI >40, [] Cachexia    [] Sepsis  [] hypovolemic shock,[] cardiogenic shock, [] hemorrhagic shock, [] neuogenic shock  [] Acute Respiratory Failure  []Cerebral edema, [] Brain compression/ herniation,   [] Functional quadriplegia  [] Acute blood loss anemia     HPI:        Hospital Course:   1/19: POD0 s/p right microvascular decompression of trigeminal nerve for trigeminal neuralgia with lumbar drain placement       Vital Signs Last 24 Hrs  T(C): 36.4 (19 Jan 2022 16:35), Max: 36.4 (19 Jan 2022 16:35)  T(F): 97.6 (19 Jan 2022 16:35), Max: 97.6 (19 Jan 2022 16:35)  HR: 96 (19 Jan 2022 18:05) (86 - 96)  BP: 164/90 (19 Jan 2022 18:05) (139/88 - 167/88)  BP(mean): 122 (19 Jan 2022 18:05) (109 - 127)  RR: 21 (19 Jan 2022 18:05) (12 - 21)  SpO2: 98% (19 Jan 2022 18:05) (94% - 98%)    I&O's Detail    19 Jan 2022 07:01  -  19 Jan 2022 18:26  --------------------------------------------------------  IN:    sodium chloride 0.9%: 150 mL  Total IN: 150 mL    OUT:    Drain (mL): 10 mL    Indwelling Catheter - Urethral (mL): 75 mL  Total OUT: 85 mL    Total NET: 65 mL        I&O's Summary    19 Jan 2022 07:01  -  19 Jan 2022 18:26  --------------------------------------------------------  IN: 150 mL / OUT: 85 mL / NET: 65 mL        PHYSICAL EXAM:  Neurological:  Awake and alert, oriented x3, NAD, coherent speech, following commands  PERRL, EOMI, +b/l horizontal nystagmus, no facial asymmetry, facial motor strength intact, tongue midline, mild decreased sensation in R V2/V3 region  GALVEZ x4 strength 5/5 throughout in UE and LE b/l  Sensation intact throughout in UE and LE b/l  +LD draining 10cc/hr      TUBES/LINES:  [] CVC  [] A-line  [] Lumbar Drain  [] Ventriculostomy  [] LEYDA  [] Rabago  [] NGT   [x] Other- LD    DIET:  [] NPO  [x] Mechanical  [] Tube feeds    LABS:                        13.1   7.74  )-----------( 164      ( 19 Jan 2022 17:20 )             38.1     01-19    138  |  103  |  9   ----------------------------<  207<H>  3.6   |  22  |  0.57    Ca    8.9      19 Jan 2022 17:20    TPro  7.1  /  Alb  4.2  /  TBili  0.2  /  DBili  x   /  AST  41<H>  /  ALT  43  /  AlkPhos  133<H>  01-19            CAPILLARY BLOOD GLUCOSE      POCT Blood Glucose.: 181 mg/dL (19 Jan 2022 16:41)  POCT Blood Glucose.: 126 mg/dL (19 Jan 2022 06:48)      Drug Levels: [] N/A    CSF Analysis: [] N/A      Allergies    No Known Allergies    Intolerances        Home Medications:  gabapentin 600 mg oral tablet: 1 tab(s) orally once a day (at bedtime) (19 Jan 2022 06:29)  omeprazole 40 mg oral delayed release capsule: 1 cap(s) orally once a day (19 Jan 2022 06:29)  OXcarbazepine 150 mg oral tablet: 1 tab(s) orally 2 times a day (19 Jan 2022 06:29)  rosuvastatin 5 mg oral tablet: 1 tab(s) orally once a day (19 Jan 2022 06:29)  sucralfate 1 g oral tablet: 1 tab(s) orally 4 times a day (before meals and at bedtime) (19 Jan 2022 06:29)      MEDICATIONS:  Antibiotics:  ceFAZolin   IVPB 2000 milliGRAM(s) IV Intermittent every 8 hours    Neuro:  acetaminophen     Tablet .. 650 milliGRAM(s) Oral every 6 hours PRN  metoclopramide Injectable 10 milliGRAM(s) IV Push every 6 hours PRN  ondansetron Injectable 4 milliGRAM(s) IV Push every 6 hours PRN  prochlorperazine   Injectable 10 milliGRAM(s) IV Push every 6 hours PRN  scopolamine 1 mG/72 Hr(s) Patch 1 Patch Transdermal every 72 hours    Anticoagulation:    OTHER:  dexAMETHasone  Injectable 2 milliGRAM(s) IV Push every 6 hours  hydrALAZINE Injectable 10 milliGRAM(s) IV Push every 2 hours PRN  influenza   Vaccine 0.5 milliLiter(s) IntraMuscular once  pantoprazole  Injectable 40 milliGRAM(s) IV Push daily  senna 2 Tablet(s) Oral at bedtime    IVF:  sodium chloride 0.9%. 1000 milliLiter(s) IV Continuous <Continuous>    CULTURES:    RADIOLOGY & ADDITIONAL TESTS:      ASSESSMENT:  61y Female with trigeminal neuralgia presenting with right sided facial pain and numbness in right V2/V3 distribution with failed medical management, s/p right microvascular decompression of trigeminal nerve with lumbar drain placement (1/19/22).      PLAN:  NEURO:  -neuro/vitals q1hr  -pain control prn    -decadron 2q6 x 2 days   -LD- clamp x 4 hours, then start to drain 10cc/hr (x48 hrs)    CARDIOVASCULAR:  -SBP goal 100-140  -prn antihypertensives     PULMONARY:  -room air, satting well     RENAL:  -IVF  -rabago in place, TOV in am  -electrolytes prn     GI:  -advance diet as tolerated  -bowel regimen  -protonix while on decadron  -nausea post op- zofran, reglan, compazine, scopolamine patch prns    HEME:  -trend h/h  -SCDs    ID:  -post op ancef     ENDO:  -ISS    DISPOSITION:   -ICU status   -Full code  -PT/OT pending   -Discussed with Dr. Ferrer      Assessment:  Present when checked    []  GCS  E   V  M     Heart Failure: []Acute, [] acute on chronic , []chronic  Heart Failure:  [] Diastolic (HFpEF), [] Systolic (HFrEF), []Combined (HFpEF and HFrEF), [] RHF, [] Pulm HTN, [] Other    [] ERICA, [] ATN, [] AIN, [] other  [] CKD1, [] CKD2, [] CKD 3, [] CKD 4, [] CKD 5, []ESRD    Encephalopathy: [] Metabolic, [] Hepatic, [] toxic, [] Neurological, [] Other    Abnormal Nurtitional Status: [] malnurtition (see nutrition note), [ ]underweight: BMI < 19, [] morbid obesity: BMI >40, [] Cachexia    [] Sepsis  [] hypovolemic shock,[] cardiogenic shock, [] hemorrhagic shock, [] neuogenic shock  [] Acute Respiratory Failure  []Cerebral edema, [] Brain compression/ herniation,   [] Functional quadriplegia  [] Acute blood loss anemia     HPI:        Hospital Course:   1/19: POD0 s/p right microvascular decompression of trigeminal nerve for trigeminal neuralgia with lumbar drain placement       Vital Signs Last 24 Hrs  T(C): 36.4 (19 Jan 2022 16:35), Max: 36.4 (19 Jan 2022 16:35)  T(F): 97.6 (19 Jan 2022 16:35), Max: 97.6 (19 Jan 2022 16:35)  HR: 96 (19 Jan 2022 18:05) (86 - 96)  BP: 164/90 (19 Jan 2022 18:05) (139/88 - 167/88)  BP(mean): 122 (19 Jan 2022 18:05) (109 - 127)  RR: 21 (19 Jan 2022 18:05) (12 - 21)  SpO2: 98% (19 Jan 2022 18:05) (94% - 98%)    I&O's Detail    19 Jan 2022 07:01  -  19 Jan 2022 18:26  --------------------------------------------------------  IN:    sodium chloride 0.9%: 150 mL  Total IN: 150 mL    OUT:    Drain (mL): 10 mL    Indwelling Catheter - Urethral (mL): 75 mL  Total OUT: 85 mL    Total NET: 65 mL        I&O's Summary    19 Jan 2022 07:01  -  19 Jan 2022 18:26  --------------------------------------------------------  IN: 150 mL / OUT: 85 mL / NET: 65 mL        PHYSICAL EXAM:  Neurological:  Awake and alert, oriented x3, NAD, coherent speech, following commands  PERRL, EOMI, +b/l horizontal nystagmus, no facial asymmetry, facial motor strength intact, tongue midline, mild decreased sensation in right side CN5 V2/V3 region  GALVEZ x4 strength 5/5 throughout in UE and LE b/l  Sensation intact throughout in UE and LE b/l  +LD draining 10cc/hr      TUBES/LINES:  [] CVC  [] A-line  [] Lumbar Drain  [] Ventriculostomy  [] LEYDA  [] Rabago  [] NGT   [x] Other- LD    DIET:  [] NPO  [x] Mechanical  [] Tube feeds    LABS:                        13.1   7.74  )-----------( 164      ( 19 Jan 2022 17:20 )             38.1     01-19    138  |  103  |  9   ----------------------------<  207<H>  3.6   |  22  |  0.57    Ca    8.9      19 Jan 2022 17:20    TPro  7.1  /  Alb  4.2  /  TBili  0.2  /  DBili  x   /  AST  41<H>  /  ALT  43  /  AlkPhos  133<H>  01-19            CAPILLARY BLOOD GLUCOSE      POCT Blood Glucose.: 181 mg/dL (19 Jan 2022 16:41)  POCT Blood Glucose.: 126 mg/dL (19 Jan 2022 06:48)      Drug Levels: [] N/A    CSF Analysis: [] N/A      Allergies    No Known Allergies    Intolerances        Home Medications:  gabapentin 600 mg oral tablet: 1 tab(s) orally once a day (at bedtime) (19 Jan 2022 06:29)  omeprazole 40 mg oral delayed release capsule: 1 cap(s) orally once a day (19 Jan 2022 06:29)  OXcarbazepine 150 mg oral tablet: 1 tab(s) orally 2 times a day (19 Jan 2022 06:29)  rosuvastatin 5 mg oral tablet: 1 tab(s) orally once a day (19 Jan 2022 06:29)  sucralfate 1 g oral tablet: 1 tab(s) orally 4 times a day (before meals and at bedtime) (19 Jan 2022 06:29)      MEDICATIONS:  Antibiotics:  ceFAZolin   IVPB 2000 milliGRAM(s) IV Intermittent every 8 hours    Neuro:  acetaminophen     Tablet .. 650 milliGRAM(s) Oral every 6 hours PRN  metoclopramide Injectable 10 milliGRAM(s) IV Push every 6 hours PRN  ondansetron Injectable 4 milliGRAM(s) IV Push every 6 hours PRN  prochlorperazine   Injectable 10 milliGRAM(s) IV Push every 6 hours PRN  scopolamine 1 mG/72 Hr(s) Patch 1 Patch Transdermal every 72 hours    Anticoagulation:    OTHER:  dexAMETHasone  Injectable 2 milliGRAM(s) IV Push every 6 hours  hydrALAZINE Injectable 10 milliGRAM(s) IV Push every 2 hours PRN  influenza   Vaccine 0.5 milliLiter(s) IntraMuscular once  pantoprazole  Injectable 40 milliGRAM(s) IV Push daily  senna 2 Tablet(s) Oral at bedtime    IVF:  sodium chloride 0.9%. 1000 milliLiter(s) IV Continuous <Continuous>    CULTURES:    RADIOLOGY & ADDITIONAL TESTS:      ASSESSMENT:  61y Female with trigeminal neuralgia presenting with right sided facial pain and numbness in right V2/V3 distribution with failed medical management, s/p right microvascular decompression of trigeminal nerve with lumbar drain placement (1/19/22).      PLAN:  NEURO:  -neuro/vitals q1hr  -pain control prn    -decadron 2q6 x 2 days   -LD- clamp x 4 hours, then start to drain 10cc/hr (x48 hrs)    CARDIOVASCULAR:  -SBP goal 100-140  -prn antihypertensives     PULMONARY:  -room air, satting well     RENAL:  -IVF  -rabago in place, TOV in am  -electrolytes prn     GI:  -advance diet as tolerated  -bowel regimen  -protonix while on decadron  -nausea post op- zofran, reglan, compazine, scopolamine patch prns    HEME:  -trend h/h  -SCDs    ID:  -post op ancef     ENDO:  -ISS    DISPOSITION:   -ICU status   -Full code  -PT/OT pending   -Discussed with Dr. Ferrer      Assessment:  Present when checked    []  GCS  E   V  M     Heart Failure: []Acute, [] acute on chronic , []chronic  Heart Failure:  [] Diastolic (HFpEF), [] Systolic (HFrEF), []Combined (HFpEF and HFrEF), [] RHF, [] Pulm HTN, [] Other    [] ERICA, [] ATN, [] AIN, [] other  [] CKD1, [] CKD2, [] CKD 3, [] CKD 4, [] CKD 5, []ESRD    Encephalopathy: [] Metabolic, [] Hepatic, [] toxic, [] Neurological, [] Other    Abnormal Nurtitional Status: [] malnurtition (see nutrition note), [ ]underweight: BMI < 19, [] morbid obesity: BMI >40, [] Cachexia    [] Sepsis  [] hypovolemic shock,[] cardiogenic shock, [] hemorrhagic shock, [] neuogenic shock  [] Acute Respiratory Failure  []Cerebral edema, [] Brain compression/ herniation,   [] Functional quadriplegia  [] Acute blood loss anemia

## 2022-01-19 NOTE — PROVIDER CONTACT NOTE (OTHER) - RECOMMENDATIONS
order for hydralazine 10mg IVP ordered 12.5mcg of fentanyl to help with pain and reassess BP if pt still needs hydralazine

## 2022-01-20 LAB
A1C WITH ESTIMATED AVERAGE GLUCOSE RESULT: 5.7 % — HIGH (ref 4–5.6)
ANION GAP SERPL CALC-SCNC: 11 MMOL/L — SIGNIFICANT CHANGE UP (ref 5–17)
ANION GAP SERPL CALC-SCNC: 12 MMOL/L — SIGNIFICANT CHANGE UP (ref 5–17)
BUN SERPL-MCNC: 8 MG/DL — SIGNIFICANT CHANGE UP (ref 7–23)
BUN SERPL-MCNC: 9 MG/DL — SIGNIFICANT CHANGE UP (ref 7–23)
CALCIUM SERPL-MCNC: 7.4 MG/DL — LOW (ref 8.4–10.5)
CALCIUM SERPL-MCNC: 8.8 MG/DL — SIGNIFICANT CHANGE UP (ref 8.4–10.5)
CHLORIDE SERPL-SCNC: 109 MMOL/L — HIGH (ref 96–108)
CHLORIDE SERPL-SCNC: 98 MMOL/L — SIGNIFICANT CHANGE UP (ref 96–108)
CO2 SERPL-SCNC: 21 MMOL/L — LOW (ref 22–31)
CO2 SERPL-SCNC: 23 MMOL/L — SIGNIFICANT CHANGE UP (ref 22–31)
CREAT SERPL-MCNC: 0.5 MG/DL — SIGNIFICANT CHANGE UP (ref 0.5–1.3)
CREAT SERPL-MCNC: 0.55 MG/DL — SIGNIFICANT CHANGE UP (ref 0.5–1.3)
ESTIMATED AVERAGE GLUCOSE: 117 MG/DL — HIGH (ref 68–114)
GLUCOSE BLDC GLUCOMTR-MCNC: 140 MG/DL — HIGH (ref 70–99)
GLUCOSE BLDC GLUCOMTR-MCNC: 167 MG/DL — HIGH (ref 70–99)
GLUCOSE BLDC GLUCOMTR-MCNC: 172 MG/DL — HIGH (ref 70–99)
GLUCOSE BLDC GLUCOMTR-MCNC: 177 MG/DL — HIGH (ref 70–99)
GLUCOSE SERPL-MCNC: 175 MG/DL — HIGH (ref 70–99)
GLUCOSE SERPL-MCNC: 181 MG/DL — HIGH (ref 70–99)
HCT VFR BLD CALC: 33.8 % — LOW (ref 34.5–45)
HCV AB S/CO SERPL IA: 0.04 S/CO — SIGNIFICANT CHANGE UP
HCV AB SERPL-IMP: SIGNIFICANT CHANGE UP
HGB BLD-MCNC: 11.5 G/DL — SIGNIFICANT CHANGE UP (ref 11.5–15.5)
MAGNESIUM SERPL-MCNC: 1.5 MG/DL — LOW (ref 1.6–2.6)
MCHC RBC-ENTMCNC: 31.2 PG — SIGNIFICANT CHANGE UP (ref 27–34)
MCHC RBC-ENTMCNC: 34 GM/DL — SIGNIFICANT CHANGE UP (ref 32–36)
MCV RBC AUTO: 91.6 FL — SIGNIFICANT CHANGE UP (ref 80–100)
NRBC # BLD: 0 /100 WBCS — SIGNIFICANT CHANGE UP (ref 0–0)
PHOSPHATE SERPL-MCNC: 3.1 MG/DL — SIGNIFICANT CHANGE UP (ref 2.5–4.5)
PLATELET # BLD AUTO: 167 K/UL — SIGNIFICANT CHANGE UP (ref 150–400)
POTASSIUM SERPL-MCNC: 3.1 MMOL/L — LOW (ref 3.5–5.3)
POTASSIUM SERPL-MCNC: 4.4 MMOL/L — SIGNIFICANT CHANGE UP (ref 3.5–5.3)
POTASSIUM SERPL-SCNC: 3.1 MMOL/L — LOW (ref 3.5–5.3)
POTASSIUM SERPL-SCNC: 4.4 MMOL/L — SIGNIFICANT CHANGE UP (ref 3.5–5.3)
RBC # BLD: 3.69 M/UL — LOW (ref 3.8–5.2)
RBC # FLD: 13.5 % — SIGNIFICANT CHANGE UP (ref 10.3–14.5)
SODIUM SERPL-SCNC: 133 MMOL/L — LOW (ref 135–145)
SODIUM SERPL-SCNC: 141 MMOL/L — SIGNIFICANT CHANGE UP (ref 135–145)
WBC # BLD: 13.78 K/UL — HIGH (ref 3.8–10.5)
WBC # FLD AUTO: 13.78 K/UL — HIGH (ref 3.8–10.5)

## 2022-01-20 PROCEDURE — 99291 CRITICAL CARE FIRST HOUR: CPT

## 2022-01-20 RX ORDER — HYDROMORPHONE HYDROCHLORIDE 2 MG/ML
0.25 INJECTION INTRAMUSCULAR; INTRAVENOUS; SUBCUTANEOUS ONCE
Refills: 0 | Status: DISCONTINUED | OUTPATIENT
Start: 2022-01-20 | End: 2022-01-20

## 2022-01-20 RX ORDER — OXYCODONE AND ACETAMINOPHEN 5; 325 MG/1; MG/1
1 TABLET ORAL EVERY 6 HOURS
Refills: 0 | Status: DISCONTINUED | OUTPATIENT
Start: 2022-01-20 | End: 2022-01-21

## 2022-01-20 RX ORDER — SODIUM CHLORIDE 9 MG/ML
1000 INJECTION, SOLUTION INTRAVENOUS
Refills: 0 | Status: DISCONTINUED | OUTPATIENT
Start: 2022-01-20 | End: 2022-01-21

## 2022-01-20 RX ORDER — CHLORHEXIDINE GLUCONATE 213 G/1000ML
1 SOLUTION TOPICAL
Refills: 0 | Status: DISCONTINUED | OUTPATIENT
Start: 2022-01-20 | End: 2022-01-24

## 2022-01-20 RX ORDER — ACETAMINOPHEN 500 MG
1000 TABLET ORAL ONCE
Refills: 0 | Status: COMPLETED | OUTPATIENT
Start: 2022-01-20 | End: 2022-01-20

## 2022-01-20 RX ORDER — MAGNESIUM SULFATE 500 MG/ML
2 VIAL (ML) INJECTION ONCE
Refills: 0 | Status: COMPLETED | OUTPATIENT
Start: 2022-01-20 | End: 2022-01-20

## 2022-01-20 RX ORDER — POTASSIUM CHLORIDE 20 MEQ
40 PACKET (EA) ORAL EVERY 4 HOURS
Refills: 0 | Status: COMPLETED | OUTPATIENT
Start: 2022-01-20 | End: 2022-01-20

## 2022-01-20 RX ADMIN — Medication 2 MILLIGRAM(S): at 11:31

## 2022-01-20 RX ADMIN — HYDROMORPHONE HYDROCHLORIDE 0.25 MILLIGRAM(S): 2 INJECTION INTRAMUSCULAR; INTRAVENOUS; SUBCUTANEOUS at 21:59

## 2022-01-20 RX ADMIN — Medication 10 MILLIGRAM(S): at 15:30

## 2022-01-20 RX ADMIN — ONDANSETRON 4 MILLIGRAM(S): 8 TABLET, FILM COATED ORAL at 05:16

## 2022-01-20 RX ADMIN — Medication 40 MILLIEQUIVALENT(S): at 10:32

## 2022-01-20 RX ADMIN — Medication 2 MILLIGRAM(S): at 23:28

## 2022-01-20 RX ADMIN — Medication 2: at 06:30

## 2022-01-20 RX ADMIN — HYDROMORPHONE HYDROCHLORIDE 0.25 MILLIGRAM(S): 2 INJECTION INTRAMUSCULAR; INTRAVENOUS; SUBCUTANEOUS at 23:17

## 2022-01-20 RX ADMIN — HYDROMORPHONE HYDROCHLORIDE 0.25 MILLIGRAM(S): 2 INJECTION INTRAMUSCULAR; INTRAVENOUS; SUBCUTANEOUS at 19:57

## 2022-01-20 RX ADMIN — Medication 2: at 11:34

## 2022-01-20 RX ADMIN — ONDANSETRON 4 MILLIGRAM(S): 8 TABLET, FILM COATED ORAL at 11:31

## 2022-01-20 RX ADMIN — Medication 2 MILLIGRAM(S): at 00:01

## 2022-01-20 RX ADMIN — Medication 1 GRAM(S): at 17:55

## 2022-01-20 RX ADMIN — Medication 40 MILLIEQUIVALENT(S): at 15:33

## 2022-01-20 RX ADMIN — Medication 1000 MILLIGRAM(S): at 11:53

## 2022-01-20 RX ADMIN — Medication 400 MILLIGRAM(S): at 11:38

## 2022-01-20 RX ADMIN — SCOPALAMINE 1 PATCH: 1 PATCH, EXTENDED RELEASE TRANSDERMAL at 18:38

## 2022-01-20 RX ADMIN — Medication 1 GRAM(S): at 12:15

## 2022-01-20 RX ADMIN — Medication 10 MILLIGRAM(S): at 06:27

## 2022-01-20 RX ADMIN — Medication 10 MILLIGRAM(S): at 00:13

## 2022-01-20 RX ADMIN — ONDANSETRON 4 MILLIGRAM(S): 8 TABLET, FILM COATED ORAL at 17:20

## 2022-01-20 RX ADMIN — Medication 2 MILLIGRAM(S): at 17:20

## 2022-01-20 RX ADMIN — Medication 10 MILLIGRAM(S): at 08:46

## 2022-01-20 RX ADMIN — OXCARBAZEPINE 150 MILLIGRAM(S): 300 TABLET, FILM COATED ORAL at 17:55

## 2022-01-20 RX ADMIN — Medication 40 MILLIEQUIVALENT(S): at 07:40

## 2022-01-20 RX ADMIN — ONDANSETRON 4 MILLIGRAM(S): 8 TABLET, FILM COATED ORAL at 23:28

## 2022-01-20 RX ADMIN — HYDROMORPHONE HYDROCHLORIDE 0.25 MILLIGRAM(S): 2 INJECTION INTRAMUSCULAR; INTRAVENOUS; SUBCUTANEOUS at 16:08

## 2022-01-20 RX ADMIN — Medication 100 MILLIGRAM(S): at 05:17

## 2022-01-20 RX ADMIN — ATORVASTATIN CALCIUM 20 MILLIGRAM(S): 80 TABLET, FILM COATED ORAL at 21:02

## 2022-01-20 RX ADMIN — Medication 25 GRAM(S): at 06:56

## 2022-01-20 RX ADMIN — Medication 2 MILLIGRAM(S): at 05:17

## 2022-01-20 RX ADMIN — Medication 10 MILLIGRAM(S): at 17:57

## 2022-01-20 RX ADMIN — GABAPENTIN 600 MILLIGRAM(S): 400 CAPSULE ORAL at 21:02

## 2022-01-20 RX ADMIN — HYDROMORPHONE HYDROCHLORIDE 0.25 MILLIGRAM(S): 2 INJECTION INTRAMUSCULAR; INTRAVENOUS; SUBCUTANEOUS at 06:30

## 2022-01-20 RX ADMIN — Medication 2: at 22:00

## 2022-01-20 RX ADMIN — SCOPALAMINE 1 PATCH: 1 PATCH, EXTENDED RELEASE TRANSDERMAL at 06:33

## 2022-01-20 RX ADMIN — SENNA PLUS 2 TABLET(S): 8.6 TABLET ORAL at 21:02

## 2022-01-20 RX ADMIN — HYDROMORPHONE HYDROCHLORIDE 0.25 MILLIGRAM(S): 2 INJECTION INTRAMUSCULAR; INTRAVENOUS; SUBCUTANEOUS at 07:10

## 2022-01-20 RX ADMIN — HYDROMORPHONE HYDROCHLORIDE 0.25 MILLIGRAM(S): 2 INJECTION INTRAMUSCULAR; INTRAVENOUS; SUBCUTANEOUS at 16:23

## 2022-01-20 RX ADMIN — Medication 10 MILLIGRAM(S): at 21:02

## 2022-01-20 RX ADMIN — PANTOPRAZOLE SODIUM 40 MILLIGRAM(S): 20 TABLET, DELAYED RELEASE ORAL at 11:30

## 2022-01-20 RX ADMIN — Medication 1 GRAM(S): at 23:28

## 2022-01-20 RX ADMIN — HYDROMORPHONE HYDROCHLORIDE 0.25 MILLIGRAM(S): 2 INJECTION INTRAMUSCULAR; INTRAVENOUS; SUBCUTANEOUS at 20:12

## 2022-01-20 RX ADMIN — SODIUM CHLORIDE 75 MILLILITER(S): 9 INJECTION, SOLUTION INTRAVENOUS at 21:03

## 2022-01-20 NOTE — OCCUPATIONAL THERAPY INITIAL EVALUATION ADULT - DIAGNOSIS, OT EVAL
Pt presents with decreased activity tolerance, facial numbness, and decreased balance impacting her ability to independently complete ADLs.

## 2022-01-20 NOTE — OCCUPATIONAL THERAPY INITIAL EVALUATION ADULT - GENERAL OBSERVATIONS, REHAB EVAL
Pt received in semisupine, ALEJANDRO Ontiveros cleared pt for therapy. +tele, +lumbar drain (clamped), +IV, +primafit, +bed alarm, NAD, room air, agreeable to OT/PT. PT Claudia present.

## 2022-01-20 NOTE — PHYSICAL THERAPY INITIAL EVALUATION ADULT - PERTINENT HX OF CURRENT PROBLEM, REHAB EVAL
61y Female with trigeminal neuralgia presenting with right sided facial pain and numbness in right V2/V3 distribution with failed medical management, s/p right microvascular decompression of trigeminal nerve with lumbar drain placement (1/19/22).

## 2022-01-20 NOTE — OCCUPATIONAL THERAPY INITIAL EVALUATION ADULT - VISUAL ACUITY
quadrant testing completed with accuracy however difficulty following directions/confrontation field

## 2022-01-20 NOTE — PHYSICAL THERAPY INITIAL EVALUATION ADULT - IMPAIRMENTS CONTRIBUTING IMPAIRED BED MOBILITY, REHAB EVAL
continued dizziness/impaired balance/impaired postural control/decreased sensation/decreased strength

## 2022-01-20 NOTE — PHYSICAL THERAPY INITIAL EVALUATION ADULT - SENSORY TESTS
R hand dominant; (L) hand  4+/5, (R) hand  4+/5. CN Testing: B/L Frontalis intact; L buccinator diminished, smile symmetrical; tongue protrusion at midline; B/L eyes open/close intact; Shoulder elevation: intact bilaterally; Vision H-Test: bilateral tracking and smooth pursuit with 2-3 beats horizontal nystagmus with tracking R>L ; Convergence: +nystagmus B eyes; Vision Quadrant Test: intact bilaterally

## 2022-01-20 NOTE — PHYSICAL THERAPY INITIAL EVALUATION ADULT - IMPAIRED TRANSFERS: SIT/STAND, REHAB EVAL
dizziness, blurry vision/impaired balance/impaired postural control/decreased sensation/decreased strength

## 2022-01-20 NOTE — PROVIDER CONTACT NOTE (OTHER) - BACKGROUND
s/p trigeminal nerve decompression and lumbar drain placement (to drain 10cc/hr)
s/p trigeminal nerve decompression and lumbar drain placement
s/p trigeminal nerve decompression and lumbar drain placement
s/p trigeminal nerve decompression and lumbar drain placement (to drain 10cc/hr)
S/p rabago removal at 7:45AM.
s/p 1/19 for trigeminal nerve decompression and lumbar drain placement
s/p trigeminal nerve decompression and lumbar drain placement
s/p microvascular cranial nerve decompression w/ Lumbar drain placement

## 2022-01-20 NOTE — OCCUPATIONAL THERAPY INITIAL EVALUATION ADULT - ADDITIONAL COMMENTS
Pt lives with her spouse in a house with 4-5 ANDRESSA, 1FOS to bedroom. Pt reports that prior to admission, pt was independent in all ADLs and IADLs, and ambulates with no device. Pt is R hand dominant.

## 2022-01-20 NOTE — PROGRESS NOTE ADULT - ASSESSMENT
ASSESSMENT:  61 y Female with trigeminal neuralgia presenting with right sided facial pain and numbness in right V2/V3 distribution with failed medical management, s/p right microvascular decompression of trigeminal nerve with lumbar drain placement (1/19/22).  POD 1    PLAN:  NEURO:  -neuro/vitals q1hr  -pain control prn    -decadron 2q6 x 2 days   -LD draining 10cc/hr  - cont home trileptal, gabapentin    CARDIOVASCULAR:  -SBP goal 100-140    PULMONARY:  -room air, satting well     RENAL:  -IVF  -rabago in place, TOV in am  -electrolytes prn     GI:  -advance diet as tolerated  -bowel regimen  -protonix while on decadron  - cont home sucralfate  -nausea post op- zofran, reglan, compazine, scopolamine patch prns    HEME:  -trend h/h  -SCDs    ID:  -post op ancef     ENDO:  -ISS    DISPOSITION:   -ICU status   -Full code  -PT/OT pending   -Discussed with Dr. Ferrer ASSESSMENT:  61 y Female with trigeminal neuralgia presenting with right sided facial pain and numbness in right V2/V3 distribution with failed medical management, s/p right microvascular decompression of trigeminal nerve with lumbar drain placement (1/19/22).  POD 1    PLAN:  NEURO:  Neuro/vitals q1hr  Pain control prn    Decadron 2mg Q 6 x 2 days   LD draining 15cc/hr  Cont home trileptal, gabapentin  Activity:     CARDIOVASCULAR:  SBP goal 100-150    PULMONARY:  Room air, satting well     RENAL:  Trial of void  Replete K aggressively  IVF until good PO intake    GI:  Advance diet as tolerated  On bowel regimen  Protonix and sucralfate per home meds  Nausea post op- zofran, reglan- standing. PRN compazine, scopolamine patch.    HEME:  Lovenox 1/21 am. SCDs    ID:  Post op Ancef  Afebrile    ENDO:  ISS  A1c 5.7    Disposition: ICU while LD in place ASSESSMENT:  62 y/o F with trigeminal neuralgia. Status post right microvascular decompression of trigeminal nerve with lumbar drain placement (1/19/22). POD 1.    PLAN:  NEURO:  Neurochecks Q1hr  Analgesia prn  Decadron 2mg Q 6 x 2 days   LD draining 15cc/hr  Continue trileptal and gabapentin per home regimen  Activity: PT/OT    CARDIOVASCULAR:  SBP goal 100-150  EKG re: QTC    PULMONARY:  Room air    RENAL:  Trial of void  Replete K aggressively  IVF until good PO intake    GI:  Advance diet as tolerated  On bowel regimen  Protonix and sucralfate per home meds  Nausea post op- zofran, reglan- standing for 24 hours.  PRN compazine. Scopolamine patch. Monitor QTc    HEME:  Lovenox 1/21 am. SCDs    ID:  Post op Ancef  Afebrile    ENDO:  ISS  A1c 5.7    Disposition: ICU while LD in place

## 2022-01-20 NOTE — PROVIDER CONTACT NOTE (OTHER) - RECOMMENDATIONS
per LILIA Mccurdy notify her or the team if patient reports right side back pain when she turns during lumbar draining

## 2022-01-20 NOTE — PHYSICAL THERAPY INITIAL EVALUATION ADULT - GENERAL OBSERVATIONS, REHAB EVAL
89yo M poor historian with PMHx HTN, HLD, CAD s/p PCI 2019, Hypothyroidism presents alongside his wife who is being admitted to the hospital and patient is unable to care fo himself on his own. POD #1 trigeminal meuralgia decompression. Pt rcvd semi supine, +tele, +lumbar drain (clamped), +IV, +Primafit, +bedalarm. Pt agreeable to PT, reports nausea/dizziness. Tolerated session fairly, reports blurry vision with dizziness "feeling like on a boat

## 2022-01-20 NOTE — PROGRESS NOTE ADULT - SUBJECTIVE AND OBJECTIVE BOX
HPI:  this info taken from patient's chart : " /..  61y Female with trigeminal neuralgia presenting with right sided facial pain  and numbness in right V2/V3 distribution with failed medical management, planned for   right microvascular decompression of trigeminal nerve with lumbar drain  placement."      (19 Jan 2022 21:13)    OVERNIGHT EVENTS: TONIA o/n neuro stable    Hospital Course:   1/19: POD0 s/p right microvascular decompression of trigeminal nerve for trigeminal neuralgia with lumbar drain placement   1/20: POD1. TONIA o/n neuro stable. nausea post op, on compazine, scopolamine patch, zofran. LD draining 10cc/hr    Vital Signs Last 24 Hrs  T(C): 37.1 (19 Jan 2022 22:00), Max: 37.1 (19 Jan 2022 22:00)  T(F): 98.8 (19 Jan 2022 22:00), Max: 98.8 (19 Jan 2022 22:00)  HR: 96 (19 Jan 2022 23:00) (86 - 103)  BP: 145/79 (19 Jan 2022 23:00) (139/88 - 167/88)  BP(mean): 106 (19 Jan 2022 23:00) (102 - 127)  RR: 20 (19 Jan 2022 23:00) (12 - 21)  SpO2: 96% (19 Jan 2022 23:00) (94% - 98%)    I&O's Summary    19 Jan 2022 07:01  -  20 Jan 2022 00:10  --------------------------------------------------------  IN: 675 mL / OUT: 465 mL / NET: 210 mL        PHYSICAL EXAM:  GEN: laying in bed, appears well, NAD  NEURO: AOx3. FC, OE spont, speech intact, face symmetric. CNII-XII intact. +R facial numbness in V2/V3 distribution. PERRL, EOMI. +b/l horizontal nystagmus. No pronator drift. MAEx4. 5/5 strength throughout.   CV: RRR +S1/S2  PULM: CTAB  GI: Abd soft, NT/ND  EXT: ext warm, dry, nontender    TUBES/LINES:  [] Rabago  [x] Lumbar Drain  [] Wound Drains  [] Others      DIET:  [] NPO  [x] Mechanical  [] Tube feeds    LABS:                        13.1   7.74  )-----------( 164      ( 19 Jan 2022 17:20 )             38.1     01-19    138  |  103  |  9   ----------------------------<  207<H>  3.6   |  22  |  0.57    Ca    8.9      19 Jan 2022 17:20    TPro  7.1  /  Alb  4.2  /  TBili  0.2  /  DBili  x   /  AST  41<H>  /  ALT  43  /  AlkPhos  133<H>  01-19            CAPILLARY BLOOD GLUCOSE      POCT Blood Glucose.: 199 mg/dL (19 Jan 2022 22:04)  POCT Blood Glucose.: 181 mg/dL (19 Jan 2022 16:41)  POCT Blood Glucose.: 126 mg/dL (19 Jan 2022 06:48)      Drug Levels: [] N/A    CSF Analysis: [] N/A      Allergies    No Known Allergies    Intolerances      MEDICATIONS:  Antibiotics:  ceFAZolin   IVPB 2000 milliGRAM(s) IV Intermittent every 8 hours    Neuro:  acetaminophen     Tablet .. 650 milliGRAM(s) Oral every 6 hours PRN  gabapentin 600 milliGRAM(s) Oral at bedtime  metoclopramide Injectable 10 milliGRAM(s) IV Push every 6 hours PRN  ondansetron Injectable 4 milliGRAM(s) IV Push every 6 hours PRN  OXcarbazepine 150 milliGRAM(s) Oral two times a day  prochlorperazine   Injectable 10 milliGRAM(s) IV Push every 6 hours PRN  scopolamine 1 mG/72 Hr(s) Patch 1 Patch Transdermal every 72 hours    Anticoagulation:    OTHER:  atorvastatin 20 milliGRAM(s) Oral at bedtime  dexAMETHasone  Injectable 2 milliGRAM(s) IV Push every 6 hours  dextrose 40% Gel 15 Gram(s) Oral once  dextrose 50% Injectable 25 Gram(s) IV Push once  dextrose 50% Injectable 12.5 Gram(s) IV Push once  dextrose 50% Injectable 25 Gram(s) IV Push once  glucagon  Injectable 1 milliGRAM(s) IntraMuscular once  hydrALAZINE Injectable 10 milliGRAM(s) IV Push every 2 hours PRN  influenza   Vaccine 0.5 milliLiter(s) IntraMuscular once  insulin lispro (ADMELOG) corrective regimen sliding scale   SubCutaneous Before meals and at bedtime  pantoprazole  Injectable 40 milliGRAM(s) IV Push daily  senna 2 Tablet(s) Oral at bedtime  sucralfate 1 Gram(s) Oral four times a day    IVF:  dextrose 5%. 1000 milliLiter(s) IV Continuous <Continuous>  dextrose 5%. 1000 milliLiter(s) IV Continuous <Continuous>  sodium chloride 0.9%. 1000 milliLiter(s) IV Continuous <Continuous>    CULTURES:    RADIOLOGY & ADDITIONAL TESTS:      ASSESSMENT:  61y Female with trigeminal neuralgia presenting with right sided facial pain and numbness in right V2/V3 distribution with failed medical management, s/p right microvascular decompression of trigeminal nerve with lumbar drain placement (1/19/22).    G50.0    Handoff    HTN (hypertension)    High cholesterol    Trigeminal neuralgia    Diverticulosis    Hiatal hernia    DM (diabetes mellitus)    Thyroid goiter    KORTNEY on CPAP    No pertinent past medical history    HTN (hypertension)    High cholesterol    Trigeminal neuralgia    Diverticulosis    Trigeminal neuralgia    Trigeminal neuralgia    Trigeminal neuralgia    Decompression, cranial nerve, microvascular    Microvascular decompression of trigeminal nerve    No significant past surgical history    History of cholecystectomy    H/O bilateral breast reduction surgery    Status post gamma knife treatment    No significant past surgical history    [id number]        PLAN:  NEURO:  -neuro/vitals q1hr  -pain control prn    -decadron 2q6 x 2 days   -LD draining 10cc/hr  - cont home trileptal, gabapentin    CARDIOVASCULAR:  -SBP goal 100-140    PULMONARY:  -room air, satting well     RENAL:  -IVF  -rabago in place, TOV in am  -electrolytes prn     GI:  -advance diet as tolerated  -bowel regimen  -protonix while on decadron  - cont home sucralfate  -nausea post op- zofran, reglan, compazine, scopolamine patch prns    HEME:  -trend h/h  -SCDs    ID:  -post op ancef     ENDO:  -ISS    DISPOSITION:   -ICU status   -Full code  -PT/OT pending   -Discussed with Dr. Ferrer          Assessment:  Present when checked    []  GCS  E   V  M     Heart Failure: []Acute, [] acute on chronic , []chronic  Heart Failure:  [] Diastolic (HFpEF), [] Systolic (HFrEF), []Combined (HFpEF and HFrEF), [] RHF, [] Pulm HTN, [] Other    [] ERICA, [] ATN, [] AIN, [] other  [] CKD1, [] CKD2, [] CKD 3, [] CKD 4, [] CKD 5, []ESRD    Encephalopathy: [] Metabolic, [] Hepatic, [] toxic, [] Neurological, [] Other    Abnormal Nurtitional Status: [] malnurtition (see nutrition note), [ ]underweight: BMI < 19, [] morbid obesity: BMI >40, [] Cachexia    [] Sepsis  [] hypovolemic shock,[] cardiogenic shock, [] hemorrhagic shock, [] neuogenic shock  [] Acute Respiratory Failure  []Cerebral edema, [] Brain compression/ herniation,   [] Functional quadriplegia  [] Acute blood loss anemia

## 2022-01-20 NOTE — PHYSICAL THERAPY INITIAL EVALUATION ADULT - ADDITIONAL COMMENTS
Pt lives in private house with 4-5 ANDRESSA, 1 flight to bedrooms with spouse. Reports indpt at baseline without device. No assist for ADLs, no falls, R handed, glasses for reading.

## 2022-01-20 NOTE — PROGRESS NOTE ADULT - SUBJECTIVE AND OBJECTIVE BOX
HPI:  this info taken from patient's chart : " /..  61y Female with trigeminal neuralgia presenting with right sided facial pain  and numbness in right V2/V3 distribution with failed medical management, planned for   right microvascular decompression of trigeminal nerve with lumbar drain  placement."      (19 Jan 2022 21:13)    OVERNIGHT EVENTS: TONIA o/n neuro stable    Hospital Course:   1/19: POD0 s/p right microvascular decompression of trigeminal nerve for trigeminal neuralgia with lumbar drain placement   1/20: POD1. TONIA o/n neuro stable. nausea post op, on compazine, scopolamine patch, zofran. LD draining 10cc/hr      ICU Vital Signs Last 24 Hrs  T(C): 37.1 (20 Jan 2022 05:43), Max: 37.1 (19 Jan 2022 22:00)  T(F): 98.7 (20 Jan 2022 05:43), Max: 98.8 (19 Jan 2022 22:00)  HR: 103 (20 Jan 2022 04:00) (86 - 104)  BP: 147/78 (20 Jan 2022 04:00) (131/55 - 167/88)  BP(mean): 100 (20 Jan 2022 04:00) (79 - 127)  ABP: 76/65 (19 Jan 2022 22:00) (76/65 - 158/93)  ABP(mean): 70 (19 Jan 2022 22:00) (70 - 118)  RR: 33 (20 Jan 2022 04:00) (12 - 33)  SpO2: 97% (20 Jan 2022 04:00) (94% - 98%)      01-19-22 @ 07:01  -  01-20-22 @ 06:33  --------------------------------------------------------  IN: 1125 mL / OUT: 815 mL / NET: 310 mL        PHYSICAL EXAM:  GEN: laying in bed, appears well, NAD  NEURO: AOx3. FC, OE spont, speech intact, face symmetric. CNII-XII intact. +R facial numbness in V2/V3 distribution. PERRL, EOMI. +b/l horizontal nystagmus. No pronator drift. MAEx4. 5/5 strength throughout.   CV: RRR +S1/S2  PULM: CTAB  GI: Abd soft, NT/ND  EXT: ext warm, dry, nontender      MEDICATIONS:  acetaminophen     Tablet .. 650 milliGRAM(s) Oral every 6 hours PRN  atorvastatin 20 milliGRAM(s) Oral at bedtime  dexAMETHasone  Injectable 2 milliGRAM(s) IV Push every 6 hours  dextrose 40% Gel 15 Gram(s) Oral once  dextrose 5%. 1000 milliLiter(s) (50 mL/Hr) IV Continuous <Continuous>  dextrose 5%. 1000 milliLiter(s) (100 mL/Hr) IV Continuous <Continuous>  dextrose 50% Injectable 25 Gram(s) IV Push once  dextrose 50% Injectable 12.5 Gram(s) IV Push once  dextrose 50% Injectable 25 Gram(s) IV Push once  gabapentin 600 milliGRAM(s) Oral at bedtime  glucagon  Injectable 1 milliGRAM(s) IntraMuscular once  hydrALAZINE Injectable 10 milliGRAM(s) IV Push every 2 hours PRN  influenza   Vaccine 0.5 milliLiter(s) IntraMuscular once  insulin lispro (ADMELOG) corrective regimen sliding scale   SubCutaneous Before meals and at bedtime  metoclopramide Injectable 10 milliGRAM(s) IV Push every 6 hours PRN  ondansetron Injectable 4 milliGRAM(s) IV Push every 6 hours PRN  OXcarbazepine 150 milliGRAM(s) Oral two times a day  pantoprazole  Injectable 40 milliGRAM(s) IV Push daily  prochlorperazine   Injectable 10 milliGRAM(s) IV Push every 6 hours PRN  scopolamine 1 mG/72 Hr(s) Patch 1 Patch Transdermal every 72 hours  senna 2 Tablet(s) Oral at bedtime  sodium chloride 0.9%. 1000 milliLiter(s) (75 mL/Hr) IV Continuous <Continuous>  sucralfate 1 Gram(s) Oral four times a day               LABS:              11.5   13.78 )-----------( 167      ( 20 Jan 2022 05:40 )             33.8     01-20    141  |  109<H>  |  8   ----------------------------<  175<H>  3.1<L>   |  21<L>  |  0.50    Ca    7.4<L>      20 Jan 2022 05:40  Phos  3.1     01-20  Mg     1.5     01-20    TPro  7.1  /  Alb  4.2  /  TBili  0.2  /  DBili  x   /  AST  41<H>  /  ALT  43  /  AlkPhos  133<H>  01-19    LIVER FUNCTIONS - ( 19 Jan 2022 17:20 )  Alb: 4.2 g/dL / Pro: 7.1 g/dL / ALK PHOS: 133 U/L / ALT: 43 U/L / AST: 41 U/L / GGT: x                     TUBES/LINES:  [] Heard  [x] Lumbar Drain  [] Wound Drains  [] Others      DIET:  [] NPO  [x] Mechanical  [] Tube feeds   HPI:  60 y/o Female with trigeminal neuralgia presenting with right sided facial pain and numbness in right V2/V3 distribution with failed medical management, planned for right microvascular decompression of trigeminal nerve with lumbar drain  placement. (19 Jan 2022 21:13)    OVERNIGHT EVENTS: TONIA o/n neuro stable    Hospital Course:   1/19: POD0 s/p right microvascular decompression of trigeminal nerve for trigeminal neuralgia with lumbar drain placement   1/20: POD1. TONIA o/n neuro stable. Nausea post op; on compazine, scopolamine patch, zofran. LD draining 10cc/hr    ROS: Still complains of nausea. No pain.   Remainder of systems negative      ICU Vital Signs Last 24 Hrs  T(C): 37.1 (20 Jan 2022 05:43), Max: 37.1 (19 Jan 2022 22:00)  T(F): 98.7 (20 Jan 2022 05:43), Max: 98.8 (19 Jan 2022 22:00)  HR: 103 (20 Jan 2022 04:00) (86 - 104)  BP: 147/78 (20 Jan 2022 04:00) (131/55 - 167/88)  BP(mean): 100 (20 Jan 2022 04:00) (79 - 127)  ABP: 76/65 (19 Jan 2022 22:00) (76/65 - 158/93)  ABP(mean): 70 (19 Jan 2022 22:00) (70 - 118)  RR: 33 (20 Jan 2022 04:00) (12 - 33)  SpO2: 97% (20 Jan 2022 04:00) (94% - 98%)      01-19-22 @ 07:01  -  01-20-22 @ 06:33  --------------------------------------------------------  IN: 1125 mL / OUT: 815 mL / NET: 310 mL      PHYSICAL EXAM:  GEN: laying in bed, appears well, NAD  NEURO: AOx3, following commands, opens eyes spontaneously, speech intact, face symmetric. CNII-XII intact. +R facial numbness in V2/V3 distribution. PERRL, EOMI. +b/l horizontal nystagmus. No pronator drift. MAEx4. 5/5 strength throughout.   CV: RRR +S1/S2  PULM: CTAB  GI: Abd soft, NT/ND  EXT: ext warm, dry, nontender      MEDICATIONS:  acetaminophen     Tablet .. 650 milliGRAM(s) Oral every 6 hours PRN  atorvastatin 20 milliGRAM(s) Oral at bedtime  dexAMETHasone  Injectable 2 milliGRAM(s) IV Push every 6 hours  dextrose 40% Gel 15 Gram(s) Oral once  dextrose 5%. 1000 milliLiter(s) (50 mL/Hr) IV Continuous <Continuous>  dextrose 5%. 1000 milliLiter(s) (100 mL/Hr) IV Continuous <Continuous>  dextrose 50% Injectable 25 Gram(s) IV Push once  dextrose 50% Injectable 12.5 Gram(s) IV Push once  dextrose 50% Injectable 25 Gram(s) IV Push once  gabapentin 600 milliGRAM(s) Oral at bedtime  glucagon  Injectable 1 milliGRAM(s) IntraMuscular once  hydrALAZINE Injectable 10 milliGRAM(s) IV Push every 2 hours PRN  influenza   Vaccine 0.5 milliLiter(s) IntraMuscular once  insulin lispro (ADMELOG) corrective regimen sliding scale   SubCutaneous Before meals and at bedtime  metoclopramide Injectable 10 milliGRAM(s) IV Push every 6 hours PRN  ondansetron Injectable 4 milliGRAM(s) IV Push every 6 hours PRN  OXcarbazepine 150 milliGRAM(s) Oral two times a day  pantoprazole  Injectable 40 milliGRAM(s) IV Push daily  prochlorperazine   Injectable 10 milliGRAM(s) IV Push every 6 hours PRN  scopolamine 1 mG/72 Hr(s) Patch 1 Patch Transdermal every 72 hours  senna 2 Tablet(s) Oral at bedtime  sodium chloride 0.9%. 1000 milliLiter(s) (75 mL/Hr) IV Continuous <Continuous>  sucralfate 1 Gram(s) Oral four times a day               LABS:              11.5   13.78 )-----------( 167      ( 20 Jan 2022 05:40 )             33.8     01-20    141  |  109<H>  |  8   ----------------------------<  175<H>  3.1<L>   |  21<L>  |  0.50    Ca    7.4<L>      20 Jan 2022 05:40  Phos  3.1     01-20  Mg     1.5     01-20    TPro  7.1  /  Alb  4.2  /  TBili  0.2  /  DBili  x   /  AST  41<H>  /  ALT  43  /  AlkPhos  133<H>  01-19    LIVER FUNCTIONS - ( 19 Jan 2022 17:20 )  Alb: 4.2 g/dL / Pro: 7.1 g/dL / ALK PHOS: 133 U/L / ALT: 43 U/L / AST: 41 U/L / GGT: x             TUBES/LINES:  [] Heard  [x] Lumbar Drain  [] Wound Drains  [] Others      DIET:  [] NPO  [x] Mechanical  [] Tube feeds   HPI:  60 y/o Female with trigeminal neuralgia presenting with right sided facial pain and numbness in right V2/V3 distribution with failed medical management, planned for right microvascular decompression of trigeminal nerve with lumbar drain  placement. (19 Jan 2022 21:13)    OVERNIGHT EVENTS: Persistent nausea. Otherwise stable.    Hospital Course:   1/19: POD0 s/p right microvascular decompression of trigeminal nerve for trigeminal neuralgia with lumbar drain placement   1/20: POD1. TONIA o/n neuro stable. Nausea post op; on compazine, scopolamine patch, zofran. LD draining 10cc/hr    ROS: Still complains of nausea. No pain.   Remainder of systems negative    ICU Vital Signs Last 24 Hrs  T(C): 37.1 (20 Jan 2022 05:43), Max: 37.1 (19 Jan 2022 22:00)  T(F): 98.7 (20 Jan 2022 05:43), Max: 98.8 (19 Jan 2022 22:00)  HR: 103 (20 Jan 2022 04:00) (86 - 104)  BP: 147/78 (20 Jan 2022 04:00) (131/55 - 167/88)  BP(mean): 100 (20 Jan 2022 04:00) (79 - 127)  ABP: 76/65 (19 Jan 2022 22:00) (76/65 - 158/93)  ABP(mean): 70 (19 Jan 2022 22:00) (70 - 118)  RR: 33 (20 Jan 2022 04:00) (12 - 33)  SpO2: 97% (20 Jan 2022 04:00) (94% - 98%)      01-19-22 @ 07:01  -  01-20-22 @ 06:33  --------------------------------------------------------  IN: 1125 mL / OUT: 815 mL / NET: 310 mL      PHYSICAL EXAM:  GEN: Calm  NEURO: Alert, awake, oriented x3, following commands, opens eyes spontaneously, speech intact, face symmetric. CNII-XII intact. +R facial numbness in V2/V3 distribution. PERRL, EOMI.   +b/l horizontal nystagmus. No pronator drift. Moving all extremities with 5/5 strength throughout.   CV: RRR +S1/S2  PULM: CTAB  GI: Abd soft, NT/ND  EXT: Ext warm, dry, nontender      MEDICATIONS:  acetaminophen     Tablet .. 650 milliGRAM(s) Oral every 6 hours PRN  atorvastatin 20 milliGRAM(s) Oral at bedtime  dexAMETHasone  Injectable 2 milliGRAM(s) IV Push every 6 hours  dextrose 40% Gel 15 Gram(s) Oral once  dextrose 5%. 1000 milliLiter(s) (50 mL/Hr) IV Continuous <Continuous>  dextrose 5%. 1000 milliLiter(s) (100 mL/Hr) IV Continuous <Continuous>  dextrose 50% Injectable 25 Gram(s) IV Push once  dextrose 50% Injectable 12.5 Gram(s) IV Push once  dextrose 50% Injectable 25 Gram(s) IV Push once  gabapentin 600 milliGRAM(s) Oral at bedtime  glucagon  Injectable 1 milliGRAM(s) IntraMuscular once  hydrALAZINE Injectable 10 milliGRAM(s) IV Push every 2 hours PRN  influenza   Vaccine 0.5 milliLiter(s) IntraMuscular once  insulin lispro (ADMELOG) corrective regimen sliding scale   SubCutaneous Before meals and at bedtime  metoclopramide Injectable 10 milliGRAM(s) IV Push every 6 hours PRN  ondansetron Injectable 4 milliGRAM(s) IV Push every 6 hours PRN  OXcarbazepine 150 milliGRAM(s) Oral two times a day  pantoprazole  Injectable 40 milliGRAM(s) IV Push daily  prochlorperazine   Injectable 10 milliGRAM(s) IV Push every 6 hours PRN  scopolamine 1 mG/72 Hr(s) Patch 1 Patch Transdermal every 72 hours  senna 2 Tablet(s) Oral at bedtime  sodium chloride 0.9%. 1000 milliLiter(s) (75 mL/Hr) IV Continuous <Continuous>  sucralfate 1 Gram(s) Oral four times a day               LABS:              11.5   13.78 )-----------( 167      ( 20 Jan 2022 05:40 )             33.8     01-20    141  |  109<H>  |  8   ----------------------------<  175<H>  3.1<L>   |  21<L>  |  0.50    Ca    7.4<L>      20 Jan 2022 05:40  Phos  3.1     01-20  Mg     1.5     01-20    TPro  7.1  /  Alb  4.2  /  TBili  0.2  /  DBili  x   /  AST  41<H>  /  ALT  43  /  AlkPhos  133<H>  01-19    LIVER FUNCTIONS - ( 19 Jan 2022 17:20 )  Alb: 4.2 g/dL / Pro: 7.1 g/dL / ALK PHOS: 133 U/L / ALT: 43 U/L / AST: 41 U/L / GGT: x             TUBES/LINES:  [] Heard  [x] Lumbar Drain  [] Wound Drains  [] Others      DIET:  [] NPO  [x] Mechanical  [] Tube feeds

## 2022-01-20 NOTE — OCCUPATIONAL THERAPY INITIAL EVALUATION ADULT - PHYSICAL ASSIST/NONPHYSICAL ASSIST:DRESS LOWER BODY, OT EVAL
to juan/doff socks seated EOB 2/2 dizziness while sitting EOB/verbal cues/nonverbal cues (demo/gestures)/1 person assist

## 2022-01-20 NOTE — PHYSICAL THERAPY INITIAL EVALUATION ADULT - IMPAIRMENTS FOUND, PT EVAL
arousal, attention, and cognition/cranial and peripheral nerve integrity/ergonomics and body mechanics/gait, locomotion, and balance/muscle strength/posture/visual motor

## 2022-01-21 LAB
ANION GAP SERPL CALC-SCNC: 10 MMOL/L — SIGNIFICANT CHANGE UP (ref 5–17)
ANION GAP SERPL CALC-SCNC: 9 MMOL/L — SIGNIFICANT CHANGE UP (ref 5–17)
BUN SERPL-MCNC: 10 MG/DL — SIGNIFICANT CHANGE UP (ref 7–23)
BUN SERPL-MCNC: 13 MG/DL — SIGNIFICANT CHANGE UP (ref 7–23)
BUN SERPL-MCNC: 9 MG/DL — SIGNIFICANT CHANGE UP (ref 7–23)
BUN SERPL-MCNC: 9 MG/DL — SIGNIFICANT CHANGE UP (ref 7–23)
CALCIUM SERPL-MCNC: 8.4 MG/DL — SIGNIFICANT CHANGE UP (ref 8.4–10.5)
CALCIUM SERPL-MCNC: 8.5 MG/DL — SIGNIFICANT CHANGE UP (ref 8.4–10.5)
CALCIUM SERPL-MCNC: 8.8 MG/DL — SIGNIFICANT CHANGE UP (ref 8.4–10.5)
CALCIUM SERPL-MCNC: SIGNIFICANT CHANGE UP MG/DL (ref 8.4–10.5)
CHLORIDE SERPL-SCNC: 101 MMOL/L — SIGNIFICANT CHANGE UP (ref 96–108)
CHLORIDE SERPL-SCNC: 102 MMOL/L — SIGNIFICANT CHANGE UP (ref 96–108)
CHLORIDE SERPL-SCNC: 96 MMOL/L — SIGNIFICANT CHANGE UP (ref 96–108)
CHLORIDE SERPL-SCNC: SIGNIFICANT CHANGE UP MMOL/L (ref 96–108)
CO2 SERPL-SCNC: 22 MMOL/L — SIGNIFICANT CHANGE UP (ref 22–31)
CO2 SERPL-SCNC: 23 MMOL/L — SIGNIFICANT CHANGE UP (ref 22–31)
CO2 SERPL-SCNC: 24 MMOL/L — SIGNIFICANT CHANGE UP (ref 22–31)
CO2 SERPL-SCNC: SIGNIFICANT CHANGE UP MMOL/L (ref 22–31)
CREAT ?TM UR-MCNC: 60 MG/DL — SIGNIFICANT CHANGE UP
CREAT SERPL-MCNC: 0.54 MG/DL — SIGNIFICANT CHANGE UP (ref 0.5–1.3)
CREAT SERPL-MCNC: 0.55 MG/DL — SIGNIFICANT CHANGE UP (ref 0.5–1.3)
CREAT SERPL-MCNC: 0.58 MG/DL — SIGNIFICANT CHANGE UP (ref 0.5–1.3)
CREAT SERPL-MCNC: SIGNIFICANT CHANGE UP MG/DL (ref 0.5–1.3)
GLUCOSE BLDC GLUCOMTR-MCNC: 152 MG/DL — HIGH (ref 70–99)
GLUCOSE BLDC GLUCOMTR-MCNC: 155 MG/DL — HIGH (ref 70–99)
GLUCOSE BLDC GLUCOMTR-MCNC: 164 MG/DL — HIGH (ref 70–99)
GLUCOSE BLDC GLUCOMTR-MCNC: 181 MG/DL — HIGH (ref 70–99)
GLUCOSE SERPL-MCNC: 158 MG/DL — HIGH (ref 70–99)
GLUCOSE SERPL-MCNC: 170 MG/DL — HIGH (ref 70–99)
GLUCOSE SERPL-MCNC: SIGNIFICANT CHANGE UP MG/DL (ref 70–99)
HCT VFR BLD CALC: 31.3 % — LOW (ref 34.5–45)
HGB BLD-MCNC: 10 G/DL — LOW (ref 11.5–15.5)
MAGNESIUM SERPL-MCNC: 1.7 MG/DL — SIGNIFICANT CHANGE UP (ref 1.6–2.6)
MCHC RBC-ENTMCNC: 30.3 PG — SIGNIFICANT CHANGE UP (ref 27–34)
MCHC RBC-ENTMCNC: 31.9 GM/DL — LOW (ref 32–36)
MCV RBC AUTO: 94.8 FL — SIGNIFICANT CHANGE UP (ref 80–100)
NRBC # BLD: 0 /100 WBCS — SIGNIFICANT CHANGE UP (ref 0–0)
OSMOLALITY SERPL: 281 MOSM/KG — SIGNIFICANT CHANGE UP (ref 280–301)
OSMOLALITY UR: 376 MOSM/KG — SIGNIFICANT CHANGE UP (ref 300–900)
PHOSPHATE SERPL-MCNC: 2.5 MG/DL — SIGNIFICANT CHANGE UP (ref 2.5–4.5)
PLATELET # BLD AUTO: 143 K/UL — LOW (ref 150–400)
POTASSIUM SERPL-MCNC: 4 MMOL/L — SIGNIFICANT CHANGE UP (ref 3.5–5.3)
POTASSIUM SERPL-MCNC: 4.4 MMOL/L — SIGNIFICANT CHANGE UP (ref 3.5–5.3)
POTASSIUM SERPL-MCNC: 4.7 MMOL/L — SIGNIFICANT CHANGE UP (ref 3.5–5.3)
POTASSIUM SERPL-MCNC: 4.8 MMOL/L — SIGNIFICANT CHANGE UP (ref 3.5–5.3)
POTASSIUM SERPL-SCNC: 4 MMOL/L — SIGNIFICANT CHANGE UP (ref 3.5–5.3)
POTASSIUM SERPL-SCNC: 4.4 MMOL/L — SIGNIFICANT CHANGE UP (ref 3.5–5.3)
POTASSIUM SERPL-SCNC: 4.7 MMOL/L — SIGNIFICANT CHANGE UP (ref 3.5–5.3)
POTASSIUM SERPL-SCNC: 4.8 MMOL/L — SIGNIFICANT CHANGE UP (ref 3.5–5.3)
RBC # BLD: 3.3 M/UL — LOW (ref 3.8–5.2)
RBC # FLD: 13.6 % — SIGNIFICANT CHANGE UP (ref 10.3–14.5)
SODIUM SERPL-SCNC: 129 MMOL/L — LOW (ref 135–145)
SODIUM SERPL-SCNC: 134 MMOL/L — LOW (ref 135–145)
SODIUM SERPL-SCNC: SIGNIFICANT CHANGE UP MMOL/L (ref 135–145)
SODIUM UR-SCNC: 24 MMOL/L — SIGNIFICANT CHANGE UP
SP GR SPEC: 1.02 — SIGNIFICANT CHANGE UP (ref 1–1.03)
WBC # BLD: 14.1 K/UL — HIGH (ref 3.8–10.5)
WBC # FLD AUTO: 14.1 K/UL — HIGH (ref 3.8–10.5)

## 2022-01-21 PROCEDURE — 99024 POSTOP FOLLOW-UP VISIT: CPT

## 2022-01-21 RX ORDER — HYDROMORPHONE HYDROCHLORIDE 2 MG/ML
0.25 INJECTION INTRAMUSCULAR; INTRAVENOUS; SUBCUTANEOUS ONCE
Refills: 0 | Status: DISCONTINUED | OUTPATIENT
Start: 2022-01-21 | End: 2022-01-21

## 2022-01-21 RX ORDER — SODIUM CHLORIDE 5 G/100ML
500 INJECTION, SOLUTION INTRAVENOUS
Refills: 0 | Status: DISCONTINUED | OUTPATIENT
Start: 2022-01-21 | End: 2022-01-22

## 2022-01-21 RX ORDER — SODIUM CHLORIDE 9 MG/ML
2 INJECTION INTRAMUSCULAR; INTRAVENOUS; SUBCUTANEOUS EVERY 6 HOURS
Refills: 0 | Status: DISCONTINUED | OUTPATIENT
Start: 2022-01-21 | End: 2022-01-23

## 2022-01-21 RX ORDER — SODIUM CHLORIDE 9 MG/ML
1000 INJECTION, SOLUTION INTRAVENOUS
Refills: 0 | Status: DISCONTINUED | OUTPATIENT
Start: 2022-01-21 | End: 2022-01-21

## 2022-01-21 RX ORDER — OXYCODONE AND ACETAMINOPHEN 5; 325 MG/1; MG/1
1 TABLET ORAL EVERY 6 HOURS
Refills: 0 | Status: DISCONTINUED | OUTPATIENT
Start: 2022-01-21 | End: 2022-01-24

## 2022-01-21 RX ORDER — DEXAMETHASONE 0.5 MG/5ML
2 ELIXIR ORAL EVERY 12 HOURS
Refills: 0 | Status: COMPLETED | OUTPATIENT
Start: 2022-01-21 | End: 2022-01-21

## 2022-01-21 RX ORDER — ACETAMINOPHEN 500 MG
1000 TABLET ORAL ONCE
Refills: 0 | Status: COMPLETED | OUTPATIENT
Start: 2022-01-21 | End: 2022-01-21

## 2022-01-21 RX ORDER — OXYCODONE AND ACETAMINOPHEN 5; 325 MG/1; MG/1
2 TABLET ORAL EVERY 6 HOURS
Refills: 0 | Status: DISCONTINUED | OUTPATIENT
Start: 2022-01-21 | End: 2022-01-23

## 2022-01-21 RX ORDER — SODIUM CHLORIDE 5 G/100ML
200 INJECTION, SOLUTION INTRAVENOUS
Refills: 0 | Status: DISCONTINUED | OUTPATIENT
Start: 2022-01-21 | End: 2022-01-21

## 2022-01-21 RX ORDER — ALPRAZOLAM 0.25 MG
0.5 TABLET ORAL ONCE
Refills: 0 | Status: DISCONTINUED | OUTPATIENT
Start: 2022-01-21 | End: 2022-01-21

## 2022-01-21 RX ADMIN — PANTOPRAZOLE SODIUM 40 MILLIGRAM(S): 20 TABLET, DELAYED RELEASE ORAL at 11:42

## 2022-01-21 RX ADMIN — Medication 2: at 16:30

## 2022-01-21 RX ADMIN — Medication 10 MILLIGRAM(S): at 15:00

## 2022-01-21 RX ADMIN — SODIUM CHLORIDE 30 MILLILITER(S): 5 INJECTION, SOLUTION INTRAVENOUS at 01:59

## 2022-01-21 RX ADMIN — ONDANSETRON 4 MILLIGRAM(S): 8 TABLET, FILM COATED ORAL at 17:12

## 2022-01-21 RX ADMIN — ONDANSETRON 4 MILLIGRAM(S): 8 TABLET, FILM COATED ORAL at 23:31

## 2022-01-21 RX ADMIN — HYDROMORPHONE HYDROCHLORIDE 0.25 MILLIGRAM(S): 2 INJECTION INTRAMUSCULAR; INTRAVENOUS; SUBCUTANEOUS at 05:48

## 2022-01-21 RX ADMIN — Medication 10 MILLIGRAM(S): at 09:00

## 2022-01-21 RX ADMIN — Medication 1 GRAM(S): at 23:31

## 2022-01-21 RX ADMIN — HYDROMORPHONE HYDROCHLORIDE 0.25 MILLIGRAM(S): 2 INJECTION INTRAMUSCULAR; INTRAVENOUS; SUBCUTANEOUS at 09:57

## 2022-01-21 RX ADMIN — Medication 10 MILLIGRAM(S): at 03:08

## 2022-01-21 RX ADMIN — GABAPENTIN 600 MILLIGRAM(S): 400 CAPSULE ORAL at 21:00

## 2022-01-21 RX ADMIN — ONDANSETRON 4 MILLIGRAM(S): 8 TABLET, FILM COATED ORAL at 11:42

## 2022-01-21 RX ADMIN — HYDROMORPHONE HYDROCHLORIDE 0.25 MILLIGRAM(S): 2 INJECTION INTRAMUSCULAR; INTRAVENOUS; SUBCUTANEOUS at 15:35

## 2022-01-21 RX ADMIN — OXYCODONE AND ACETAMINOPHEN 2 TABLET(S): 5; 325 TABLET ORAL at 14:48

## 2022-01-21 RX ADMIN — SCOPALAMINE 1 PATCH: 1 PATCH, EXTENDED RELEASE TRANSDERMAL at 06:05

## 2022-01-21 RX ADMIN — Medication 10 MILLIGRAM(S): at 20:37

## 2022-01-21 RX ADMIN — SENNA PLUS 2 TABLET(S): 8.6 TABLET ORAL at 21:00

## 2022-01-21 RX ADMIN — Medication 2 MILLIGRAM(S): at 17:12

## 2022-01-21 RX ADMIN — Medication 1000 MILLIGRAM(S): at 21:36

## 2022-01-21 RX ADMIN — HYDROMORPHONE HYDROCHLORIDE 0.25 MILLIGRAM(S): 2 INJECTION INTRAMUSCULAR; INTRAVENOUS; SUBCUTANEOUS at 05:47

## 2022-01-21 RX ADMIN — Medication 10 MILLIGRAM(S): at 09:14

## 2022-01-21 RX ADMIN — Medication 400 MILLIGRAM(S): at 21:01

## 2022-01-21 RX ADMIN — Medication 650 MILLIGRAM(S): at 04:24

## 2022-01-21 RX ADMIN — Medication 10 MILLIGRAM(S): at 06:29

## 2022-01-21 RX ADMIN — Medication 1 GRAM(S): at 05:53

## 2022-01-21 RX ADMIN — OXCARBAZEPINE 150 MILLIGRAM(S): 300 TABLET, FILM COATED ORAL at 05:53

## 2022-01-21 RX ADMIN — Medication 650 MILLIGRAM(S): at 05:00

## 2022-01-21 RX ADMIN — OXYCODONE AND ACETAMINOPHEN 1 TABLET(S): 5; 325 TABLET ORAL at 21:55

## 2022-01-21 RX ADMIN — OXYCODONE AND ACETAMINOPHEN 1 TABLET(S): 5; 325 TABLET ORAL at 14:45

## 2022-01-21 RX ADMIN — SODIUM CHLORIDE 2 GRAM(S): 9 INJECTION INTRAMUSCULAR; INTRAVENOUS; SUBCUTANEOUS at 23:31

## 2022-01-21 RX ADMIN — ATORVASTATIN CALCIUM 20 MILLIGRAM(S): 80 TABLET, FILM COATED ORAL at 21:00

## 2022-01-21 RX ADMIN — SCOPALAMINE 1 PATCH: 1 PATCH, EXTENDED RELEASE TRANSDERMAL at 18:49

## 2022-01-21 RX ADMIN — ONDANSETRON 4 MILLIGRAM(S): 8 TABLET, FILM COATED ORAL at 05:52

## 2022-01-21 RX ADMIN — Medication 10 MILLIGRAM(S): at 04:24

## 2022-01-21 RX ADMIN — Medication 2: at 22:32

## 2022-01-21 RX ADMIN — Medication 2: at 06:30

## 2022-01-21 RX ADMIN — HYDROMORPHONE HYDROCHLORIDE 0.25 MILLIGRAM(S): 2 INJECTION INTRAMUSCULAR; INTRAVENOUS; SUBCUTANEOUS at 09:56

## 2022-01-21 RX ADMIN — OXYCODONE AND ACETAMINOPHEN 2 TABLET(S): 5; 325 TABLET ORAL at 15:58

## 2022-01-21 RX ADMIN — Medication 10 MILLIGRAM(S): at 19:21

## 2022-01-21 RX ADMIN — OXYCODONE AND ACETAMINOPHEN 1 TABLET(S): 5; 325 TABLET ORAL at 19:42

## 2022-01-21 RX ADMIN — Medication 1 GRAM(S): at 17:12

## 2022-01-21 RX ADMIN — Medication 2 MILLIGRAM(S): at 05:53

## 2022-01-21 RX ADMIN — OXCARBAZEPINE 150 MILLIGRAM(S): 300 TABLET, FILM COATED ORAL at 17:12

## 2022-01-21 RX ADMIN — Medication 1 GRAM(S): at 11:42

## 2022-01-21 RX ADMIN — HYDROMORPHONE HYDROCHLORIDE 0.25 MILLIGRAM(S): 2 INJECTION INTRAMUSCULAR; INTRAVENOUS; SUBCUTANEOUS at 17:11

## 2022-01-21 RX ADMIN — OXYCODONE AND ACETAMINOPHEN 1 TABLET(S): 5; 325 TABLET ORAL at 09:01

## 2022-01-21 NOTE — PROVIDER CONTACT NOTE (OTHER) - ASSESSMENT
No drifts noted in LE. Sensation equal and remain the same from baseline in both LE. Pt denies numbness and tingliness. Pulses palpable
patient reports right sided back pain while draining her lumbar drain. Stopped draining at 11cc due to patient in pain
Neurologically intact. Moving all extremities. Lower extremities sensation remain the same; no numbness or tingliness noted
Denies pain. NIBP 158/82, HR 95
Patient verbalized pain has subsided post fentanyl 12.5mcg IVP
Pt has not reported having sensation to void despite primafit being in place
Pt neurologically intact. BP elevated to 160s and tachycardic.
patient reports intense pain from right side of back radiating to right leg. Denies numbness/tingling to B/L LE. Moving all extremities. Lumbar site CDI
patient continues to report pain and nausea
patient reports pain and nausea

## 2022-01-21 NOTE — DIETITIAN INITIAL EVALUATION ADULT. - OTHER INFO
61F with hx of diverticulosis, DM (A1C 5.7%- well controlled), Hiatal hernia, high cholesterol, KORTNEY on CPAP, Thyroid goiter, trigeminal neuralgia. Status post day #1 right microvascular decompression of trigeminal nerve with lumbar drain placement (1/19/22).     On assessment, pt resting in bed. Currently on Full liquid diet, noted to be nauseous this am. Consumed 1-2 apple sauces this am. RN gave pt zofran while RD was at bedside- cont to monitor for N/V. No d/c noted. Skin surgical incision, gaviota score 19. Pain: 7/10 right back. Pt reporting good appetite PTA- follows a dietitian outpatient who helps pt follow a general healthy/ CST CHO diet. DM is well controlled (A1C 5.7%). UBW consistent with admission weight (~200lbs). NKFA. Education deferred at this time 2/2 nausea- RD to follow up. Please see nutr recs below. RD to follow.

## 2022-01-21 NOTE — PROVIDER CONTACT NOTE (OTHER) - NAME OF MD/NP/PA/DO NOTIFIED:
LILIA Cruz
LILIA Napoles
LILIA Crowe
LILIA Cruz
LILIA Napoles
LILIA Napoles
LILIA Mccurdy
LILIA Wick Neuro
LILIA cMcurdy
MD Octavia Lopez

## 2022-01-21 NOTE — DIETITIAN INITIAL EVALUATION ADULT. - PERTINENT MEDS FT
LR @ 75 ml/hr  3% NaCl @ 30 ml/hr  Insulin regimen  Pain regimen  Protonix  Senna  Glucagon  Reglan  Zofran

## 2022-01-21 NOTE — PROGRESS NOTE ADULT - SUBJECTIVE AND OBJECTIVE BOX
HPI:  this info taken from patient's chart : " /..  61y Female with trigeminal neuralgia presenting with right sided facial pain  and numbness in right V2/V3 distribution with failed medical management, planned for   right microvascular decompression of trigeminal nerve with lumbar drain  placement."      (2022 21:13)      Hospital Course:   : POD0 s/p right microvascular decompression of trigeminal nerve for trigeminal neuralgia with lumbar drain placement   : POD1. TONIA o/n neuro stable. nausea post op, on compazine, scopolamine patch, zofran. LD draining 10cc/hr increased to 15/hr but experienced worsening headache and back pain, decreased back to 10cc/hr.   : POD2 dilaudid IV given o/n for right low back pain, neuro stable      Vital Signs Last 24 Hrs  T(C): 36.6 (2022 01:00), Max: 37.1 (2022 05:43)  T(F): 97.9 (2022 01:00), Max: 98.7 (2022 05:43)  HR: 69 (2022 04:00) (61 - 103)  BP: 146/74 (2022 04:00) (127/69 - 164/74)  BP(mean): 103 (2022 04:00) (89 - 110)  RR: 19 (2022 04:00) (13 - 39)  SpO2: 95% (2022 04:00) (93% - 97%)    I&O's Detail    2022 07:01  -  2022 07:00  --------------------------------------------------------  IN:    IV PiggyBack: 150 mL    sodium chloride 0.9%: 1125 mL  Total IN: 1275 mL    OUT:    Drain (mL): 120 mL    Emesis (mL): 200 mL    Indwelling Catheter - Urethral (mL): 710 mL  Total OUT: 1030 mL    Total NET: 245 mL      2022 07:01  -  2022 05:02  --------------------------------------------------------  IN:    IV PiggyBack: 100 mL    Lactated Ringers: 225 mL    Lactated Ringers w/ Additives: 375 mL    Oral Fluid: 660 mL    sodium chloride 0.9%: 900 mL    sodium chloride 3%: 90 mL  Total IN: 2350 mL    OUT:    Drain (mL): 251 mL    Indwelling Catheter - Urethral (mL): 50 mL    Intermittent Catheterization - Urethral (mL): 280 mL    Voided (mL): 600 mL  Total OUT: 1181 mL    Total NET: 1169 mL        I&O's Summary    2022 07:01  -  2022 07:00  --------------------------------------------------------  IN: 1275 mL / OUT: 1030 mL / NET: 245 mL    2022 07:01  -  2022 05:02  --------------------------------------------------------  IN: 2350 mL / OUT: 1181 mL / NET: 1169 mL        PHYSICAL EXAM:  GEN: laying in bed, appears well, NAD  NEURO: AOx3. FC, OE spont, speech intact, face symmetric. CNII-XII intact. +R facial numbness in V1/V2/V3 distribution. PERRL, EOMI. +b/l horizontal nystagmus. No pronator drift. MAEx4. 5/5 strength throughout.   CV: RRR +S1/S2  PULM: CTAB  GI: Abd soft, NT/ND  EXT: ext warm, dry, nontender  lumbar drain site c/d/i    TUBES/LINES:  [] Heard  [x] Lumbar Drain  [] Wound Drains  [] Others      DIET:  [] NPO  [x] Mechanical  [] Tube feeds      LABS:                        11.5   13.78 )-----------( 167      ( 2022 05:40 )             33.8         134<L>  |  101  |  9   ----------------------------<  170<H>  4.7   |  23  |  0.55    Ca    8.5      2022 23:59  Phos  3.1       Mg     1.5         TPro  7.1  /  Alb  4.2  /  TBili  0.2  /  DBili  x   /  AST  41<H>  /  ALT  43  /  AlkPhos  133<H>        Urinalysis Basic - ( 2022 02:46 )    Color: x / Appearance: x / S.020 / pH: x  Gluc: x / Ketone: x  / Bili: x / Urobili: x   Blood: x / Protein: x / Nitrite: x   Leuk Esterase: x / RBC: x / WBC x   Sq Epi: x / Non Sq Epi: x / Bacteria: x          CAPILLARY BLOOD GLUCOSE      POCT Blood Glucose.: 167 mg/dL (2022 21:33)  POCT Blood Glucose.: 140 mg/dL (2022 16:44)  POCT Blood Glucose.: 172 mg/dL (2022 11:31)  POCT Blood Glucose.: 177 mg/dL (2022 06:25)      Drug Levels: [] N/A    CSF Analysis: [] N/A      Allergies    No Known Allergies    Intolerances      MEDICATIONS:  Antibiotics:    Neuro:  acetaminophen     Tablet .. 650 milliGRAM(s) Oral every 6 hours PRN  gabapentin 600 milliGRAM(s) Oral at bedtime  metoclopramide Injectable 10 milliGRAM(s) IV Push every 6 hours  ondansetron Injectable 4 milliGRAM(s) IV Push every 6 hours  OXcarbazepine 150 milliGRAM(s) Oral two times a day  oxycodone    5 mG/acetaminophen 325 mG 1 Tablet(s) Oral every 6 hours PRN  prochlorperazine   Injectable 10 milliGRAM(s) IV Push every 6 hours PRN  scopolamine 1 mG/72 Hr(s) Patch 1 Patch Transdermal every 72 hours    Anticoagulation:    OTHER:  atorvastatin 20 milliGRAM(s) Oral at bedtime  chlorhexidine 2% Cloths 1 Application(s) Topical <User Schedule>  dexAMETHasone  Injectable 2 milliGRAM(s) IV Push every 6 hours  dextrose 40% Gel 15 Gram(s) Oral once  dextrose 50% Injectable 12.5 Gram(s) IV Push once  dextrose 50% Injectable 25 Gram(s) IV Push once  dextrose 50% Injectable 25 Gram(s) IV Push once  glucagon  Injectable 1 milliGRAM(s) IntraMuscular once  hydrALAZINE Injectable 10 milliGRAM(s) IV Push every 2 hours PRN  influenza   Vaccine 0.5 milliLiter(s) IntraMuscular once  insulin lispro (ADMELOG) corrective regimen sliding scale   SubCutaneous Before meals and at bedtime  pantoprazole  Injectable 40 milliGRAM(s) IV Push daily  senna 2 Tablet(s) Oral at bedtime  sucralfate 1 Gram(s) Oral four times a day    IVF:  dextrose 5%. 1000 milliLiter(s) IV Continuous <Continuous>  dextrose 5%. 1000 milliLiter(s) IV Continuous <Continuous>  lactated ringers. 1000 milliLiter(s) IV Continuous <Continuous>  sodium chloride 3%. 200 milliLiter(s) IV Continuous <Continuous>    CULTURES:    RADIOLOGY & ADDITIONAL TESTS:    G50.0    Handoff    HTN (hypertension)    High cholesterol    Trigeminal neuralgia    Diverticulosis    Hiatal hernia    DM (diabetes mellitus)    Thyroid goiter    KORTNEY on CPAP    No pertinent past medical history    HTN (hypertension)    High cholesterol    Trigeminal neuralgia    Diverticulosis    Trigeminal neuralgia    Trigeminal neuralgia    Trigeminal neuralgia    Decompression, cranial nerve, microvascular    Microvascular decompression of trigeminal nerve    No significant past surgical history    History of cholecystectomy    H/O bilateral breast reduction surgery    Status post gamma knife treatment    No significant past surgical history    [id number]              Assessment:    61y Female with trigeminal neuralgia presenting with right sided facial pain and numbness in right V2/V3 distribution with failed medical management, s/p right microvascular decompression of trigeminal nerve with lumbar drain placement (22).      NEURO:  -neuro/vitals q1hr  -pain control prn    -decadron 2q6 x 2 days   -LD draining 10cc/hr  - cont home trileptal, gabapentin  - PT/OT     CARDIOVASCULAR:  -SBP goal 100-150  -QTC : 418    PULMONARY:  -room air, satting well   - hx of KORTNEY, sat goal > 92% ok    RENAL:  - LR at 75  - s/p 3% bolus 200cc  -straight cath prn   -electrolytes prn     GI:  -advance diet as tolerated  -bowel regimen  -protonix while on decadron  - cont home sucralfate 1 qid   -nausea post op- zofran, reglan, compazine, scopolamine patch prns    HEME:  -trend h/h  -SCDs, holding sql, will give early lovenox dose tomorrow     ID:  - no leukocytosis, afebrile     ENDO:  -ISS    DISPOSITION:   -ICU status   -Full code  -PT/OT pending   -Discussed with Dr. Ferrer  Present when checked    []  GCS  E   V  M     Heart Failure: []Acute, [] acute on chronic , []chronic  Heart Failure:  [] Diastolic (HFpEF), [] Systolic (HFrEF), []Combined (HFpEF and HFrEF), [] RHF, [] Pulm HTN, [] Other    [] ERICA, [] ATN, [] AIN, [] other  [] CKD1, [] CKD2, [] CKD 3, [] CKD 4, [] CKD 5, []ESRD    Encephalopathy: [] Metabolic, [] Hepatic, [] toxic, [] Neurological, [] Other    Abnormal Nurtitional Status: [] malnurtition (see nutrition note), [ ]underweight: BMI < 19, [] morbid obesity: BMI >40, [] Cachexia    [] Sepsis  [] hypovolemic shock,[] cardiogenic shock, [] hemorrhagic shock, [] neuogenic shock  [] Acute Respiratory Failure  []Cerebral edema, [] Brain compression/ herniation,   [] Functional quadriplegia  [] Acute blood loss anemia

## 2022-01-21 NOTE — PROVIDER CONTACT NOTE (OTHER) - ACTION/TREATMENT ORDERED:
PA at bedside to assess patient; Dilaudid .25mg given. Pt siting on edge of bed
Zofran and IV tylenol given. Will reassess
pending pain orders to be administered
As per PA, pt was only making ~40ml/hr of urine prior to rabago removal, and over past 7hrs pt has made 280ml which averages 40ml/hr. No interventions, team will give pt more time to void independently
No interventions ordered. Will continue to monitor
will continue to monitor
will continue to monitor.
will administer

## 2022-01-21 NOTE — DIETITIAN INITIAL EVALUATION ADULT. - ADD RECOMMEND
1. Pain and bowel regimen per team 2. Cont to monitor lytes and replete prn 3. RD diet edu prn 4. Cont with tight BS control per team

## 2022-01-21 NOTE — PROVIDER CONTACT NOTE (OTHER) - SITUATION
/90, HR 96. A-line inconsistent with readings, poor blood return & poor wave form. A-line site ecchymotic.
Pt had episode of emesis, about 100cc, accompanied with 10/10 headache
BP elevated 167/88, HR 88. Above 
Pt c/o pain 10/10 on the right side near Lumbar drain
BP elevated 165/103, HR 92 SBP above 140
Pt complains of weakness in LE
BP elevated 158/82, HR 95
Pt only made 280ml urine over 7hrs via straight cath at 3:15pm. Writer only able to account for 280ml of urine for patient throughout shift (as next straight cath will be at 9pm)
patient reports 10/10 right sided back pain during Lumbar Drain draining of 15cc
patient turned to her right side during Lumbar draining of 15cc and reported intense pain from right side of her back radiating to her right leg and patient straight cath cath earlier was 280.

## 2022-01-21 NOTE — PROGRESS NOTE ADULT - ASSESSMENT
ASSESSMENT:  60 y/o F with trigeminal neuralgia. Status post day #1 right microvascular decompression of trigeminal nerve with lumbar drain placement (1/19/22).     PLAN:  NEURO:  Neuro checks Q2hr  Analgesia prn  Decadron 2mg Q 6 x 2 days   LD draining 15cc/hr  Continue trileptal and gabapentin per home regimen  Activity: PT/OT    CARDIOVASCULAR:  SBP goal 100-<150  EKG re: QTC    PULMONARY:  Room air- maintain sat  >92%    RENAL:  Trial of void  Replete K aggressively  IVF until good PO intake    GI:  Advance diet as tolerated  On bowel regimen- senna   Protonix and sucralfate per home meds  Nausea post op- zofran, reglan- standing for 24 hours.  PRN compazine. Scopolamine patch. Monitor QTc    HEME:  Lovenox  40mg q day  . SCDs    ID:  Post op Ancef  Afebrile    ENDO:  ISS  A1c 5.7    Disposition: ICU while LD in place ASSESSMENT:  60 y/o F with trigeminal neuralgia. Status post day #1 right microvascular decompression of trigeminal nerve with lumbar drain placement (1/19/22).     PLAN:  NEURO:  Neuro checks Q1hr  Analgesia prn   D/C Decadron  LD draining 15cc/hr- clamp 2 pm today  Continue trileptal and gabapentin per home regimen  Activity: PT/OT    CARDIOVASCULAR:  SBP goal 100-<150  EKG re: QTC    PULMONARY:  Room air- maintain sat  >92%    RENAL: Hyponatremia  Likely SIADH - NS at 40 cc/hr - 10 pm  Repeat SMA-7 in 8 hrs  Trial of void  Correct electrolytes      GI:  Advance diet as tolerated  On bowel regimen- senna   Protonix and sucralfate per home meds  Nausea post op- zofran, reglan- standing for 24 hours.  PRN compazine. Scopolamine patch. Monitor QTc    HEME:  Lovenox  40mg q day- start in pm 1/22/22  . SCDs    ID:  Post op Ancef  Afebrile    ENDO:  ISS  A1c 5.7    Disposition: ICU while LD in place ASSESSMENT:  62 y/o F with trigeminal neuralgia. Status post day #1 right microvascular decompression of trigeminal nerve with lumbar drain placement (1/19/22).     PLAN:  NEURO:  Neuro checks Q1hr  Analgesia prn   D/C Decadron  LD draining 15cc/hr- clamp 2 pm today  Continue trileptal and gabapentin per home regimen  Activity: PT/OT    CARDIOVASCULAR:  SBP goal 100-<150  EKG re: QTC    PULMONARY:  Room air- maintain sat  >92%    RENAL: Hyponatremia  Likely SIADH - NS at 40 cc/hr - 10 pm  Repeat SMA-7 in 8 hrs  Trial of void  Correct electrolytes      GI:  Advance diet as tolerated  On bowel regimen- senna   Protonix and sucralfate per home meds  Nausea post op- zofran, reglan- standing for 24 hours.  PRN compazine. Scopolamine patch. Monitor QTc    HEME: Thrombocytopenia  Monitor PLTs - was taking PPI at home  ( not likely source of low PLTS )  Lovenox  40mg q day- start in pm 1/22/22   SCDs    ID:  Post op Ancef  Afebrile    ENDO:  ISS  A1c 5.7    Disposition: ICU while LD in place ASSESSMENT:  62 y/o F with trigeminal neuralgia. Status post day #1 right microvascular decompression of trigeminal nerve with lumbar drain placement (1/19/22).     PLAN:  NEURO:  Neuro checks Q1hr  Analgesia prn   D/C Decadron  LD draining 15cc/hr- clamp 2 pm today  Continue trileptal and gabapentin per home regimen  Activity: PT/OT    CARDIOVASCULAR:  SBP goal 100-<150  EKG re: QTC    PULMONARY:  Room air- maintain sat  >92%    RENAL: Hyponatremia  Likely SIADH - NS at 40 cc/hr - 8 pm  Repeat SMA-7 in 8 hrs  Trial of void  Correct electrolytes      GI:  Advance diet as tolerated  On bowel regimen- senna   Protonix and sucralfate per home meds  Nausea post op- zofran, reglan- standing for 24 hours.  PRN compazine. Scopolamine patch. Monitor QTc    HEME: Thrombocytopenia  Monitor PLTs - was taking PPI at home  ( not likely source of low PLTS )  Lovenox  40mg q day- start in pm 1/22/22   SCDs    ID:  Post op Ancef  Afebrile    ENDO:  ISS  A1c 5.7    Disposition: ICU while LD in place

## 2022-01-21 NOTE — PROGRESS NOTE ADULT - SUBJECTIVE AND OBJECTIVE BOX
HPI:  60 y/o Female with trigeminal neuralgia presenting with right sided facial pain and numbness in right V2/V3 distribution with failed medical management, planned for right microvascular decompression of trigeminal nerve with lumbar drain  placement. (19 Jan 2022 21:13)    OVERNIGHT EVENTS:     Hospital Course:   1/19: POD0 s/p right microvascular decompression of trigeminal nerve for trigeminal neuralgia with lumbar drain placement   1/20: POD1. TONIA o/n neuro stable. Nausea post op; on compazine, scopolamine patch, zofran. LD draining 10cc/hr    ROS: Still complains of nausea. No pain.   Remainder of systems negative    ICU Vital Signs Last 24 Hrs  ICU Vital Signs Last 24 Hrs  T(C): 36.5 (21 Jan 2022 05:30), Max: 37.1 (20 Jan 2022 09:05)  T(F): 97.7 (21 Jan 2022 05:30), Max: 98.7 (20 Jan 2022 09:05)  HR: 86 (21 Jan 2022 07:00) (61 - 103)  BP: 155/78 (21 Jan 2022 07:00) (127/69 - 164/74)  BP(mean): 109 (21 Jan 2022 07:00) (89 - 110)  RR: 16 (21 Jan 2022 07:00) (13 - 39)  SpO2: 95% (21 Jan 2022 07:00) (93% - 97%)    TUBES/LINES:  [] Heard  [x] Lumbar Drain  [] Wound Drains  [] Others      DIET:  [] NPO  [x] Mechanical  [] Tube feeds      20 Jan 2022 07:01  -  21 Jan 2022 07:00  --------------------------------------------------------  IN:    IV PiggyBack: 100 mL    Lactated Ringers: 375 mL    Lactated Ringers w/ Additives: 375 mL    Oral Fluid: 660 mL    sodium chloride 0.9%: 900 mL    sodium chloride 3%: 180 mL  Total IN: 2590 mL    OUT:    Drain (mL): 271 mL    Indwelling Catheter - Urethral (mL): 50 mL    Intermittent Catheterization - Urethral (mL): 280 mL    Voided (mL): 800 mL  Total OUT: 1401 mL    Total NET: 1189 mL      21 Jan 2022 07:01  -  21 Jan 2022 07:33  --------------------------------------------------------  IN:    Lactated Ringers: 75 mL  Total IN: 75 mL    OUT:  Total OUT: 0 mL    Total NET: 75 mL          01-19-22 @ 07:01  -  01-20-22 @ 06:33   NET: 310 mL      MEDICATIONS:  acetaminophen     Tablet .. 650 milliGRAM(s) Oral every 6 hours PRN  atorvastatin 20 milliGRAM(s) Oral at bedtime  dexAMETHasone  Injectable 2 milliGRAM(s) IV Push every 6 hours  dextrose 40% Gel 15 Gram(s) Oral once  dextrose 5%. 1000 milliLiter(s) (50 mL/Hr) IV Continuous <Continuous>  dextrose 5%. 1000 milliLiter(s) (100 mL/Hr) IV Continuous <Continuous>  dextrose 50% Injectable 25 Gram(s) IV Push once  dextrose 50% Injectable 12.5 Gram(s) IV Push once  dextrose 50% Injectable 25 Gram(s) IV Push once  gabapentin 600 milliGRAM(s) Oral at bedtime  glucagon  Injectable 1 milliGRAM(s) IntraMuscular once  hydrALAZINE Injectable 10 milliGRAM(s) IV Push every 2 hours PRN  influenza   Vaccine 0.5 milliLiter(s) IntraMuscular once  insulin lispro (ADMELOG) corrective regimen sliding scale   SubCutaneous Before meals and at bedtime  metoclopramide Injectable 10 milliGRAM(s) IV Push every 6 hours PRN  ondansetron Injectable 4 milliGRAM(s) IV Push every 6 hours PRN  OXcarbazepine 150 milliGRAM(s) Oral two times a day  pantoprazole  Injectable 40 milliGRAM(s) IV Push daily  prochlorperazine   Injectable 10 milliGRAM(s) IV Push every 6 hours PRN  scopolamine 1 mG/72 Hr(s) Patch 1 Patch Transdermal every 72 hours  senna 2 Tablet(s) Oral at bedtime  sodium chloride 0.9%. 1000 milliLiter(s) (75 mL/Hr) IV Continuous <Continuous>  sucralfate 1 Gram(s) Oral four times a day               LABS:                             10.0   14.10 )-----------( 143      ( 21 Jan 2022 06:09 )             31.3              11.5   13.78 )-----------( 167      ( 20 Jan 2022 05:40 )             33.8     01-21        TPro  7.1  /  Alb  4.2  /  TBili  0.2  /  DBili  x   /  AST  41<H>  /  ALT  43  /  AlkPhos  133<H>  01-19 01-20    141  |  109<H>  |  8   ----------------------------<  175<H>  3.1<L>   |  21<L>  |  0.50    Ca    7.4<L>      20 Jan 2022 05:40  Phos  3.1     01-20  Mg     1.5     01-20    TPro  7.1  /  Alb  4.2  /  TBili  0.2  /  DBili  x   /  AST  41<H>  /  ALT  43  /  AlkPhos  133<H>  01-19    LIVER FUNCTIONS - ( 19 Jan 2022 17:20 )  Alb: 4.2 g/dL / Pro: 7.1 g/dL / ALK PHOS: 133 U/L / ALT: 43 U/L / AST: 41 U/L / GGT: x        PHYSICAL EXAM:  GEN: Calm  NEURO: Alert, awake, oriented x3, following commands, opens eyes spontaneously, speech intact, face symmetric. CNII-XII intact. +R facial numbness in V2/V3 distribution. PERRL, EOMI.   +b/l horizontal nystagmus. No pronator drift. Moving all extremities with 5/5 strength throughout.   CV: RRR +S1/S2  PULM: CTAB  GI: Abd soft, NT/ND  EXT: Ext warm, dry, nontender          HPI:  60 y/o Female with trigeminal neuralgia presenting with right sided facial pain and numbness in right V2/V3 distribution with failed medical management, planned for right microvascular decompression of trigeminal nerve with lumbar drain  placement. (19 Jan 2022 21:13)    OVERNIGHT EVENTS:     Hospital Course:   1/19: POD0 s/p right microvascular decompression of trigeminal nerve for trigeminal neuralgia with lumbar drain placement   1/20: POD1. TONIA o/n neuro stable. Nausea post op; on compazine, scopolamine patch, zofran. LD draining 10cc/hr    ROS: Still complains of nausea./ dizziness  Remainder of systems negative    ICU Vital Signs Last 24 Hrs  ICU Vital Signs Last 24 Hrs  T(C): 36.5 (21 Jan 2022 05:30), Max: 37.1 (20 Jan 2022 09:05)  T(F): 97.7 (21 Jan 2022 05:30), Max: 98.7 (20 Jan 2022 09:05)  HR: 86 (21 Jan 2022 07:00) (61 - 103)  BP: 155/78 (21 Jan 2022 07:00) (127/69 - 164/74)  BP(mean): 109 (21 Jan 2022 07:00) (89 - 110)  RR: 16 (21 Jan 2022 07:00) (13 - 39)  SpO2: 95% (21 Jan 2022 07:00) (93% - 97%)    TUBES/LINES:  [] Heard  [x] Lumbar Drain        DIET:  [] NPO  [x] Mechanical  [] Tube feeds      20 Jan 2022 07:01  -  21 Jan 2022 07:00  --------------------------------------------------------  IN:    IV PiggyBack: 100 mL    Lactated Ringers: 375 mL    Lactated Ringers w/ Additives: 375 mL    Oral Fluid: 660 mL    sodium chloride 0.9%: 900 mL    sodium chloride 3%: 180 mL  Total IN: 2590 mL    OUT:    Drain (mL): 271 mL    Indwelling Catheter - Urethral (mL): 50 mL    Intermittent Catheterization - Urethral (mL): 280 mL    Voided (mL): 800 mL  Total OUT: 1401 mL    Total NET: 1189 mL      21 Jan 2022 07:01  -  21 Jan 2022 07:33  --------------------------------------------------------  IN:    Lactated Ringers: 75 mL  Total IN: 75 mL    OUT:  Total OUT: 0 mL    Total NET: 75 mL          01-19-22 @ 07:01  -  01-20-22 @ 06:33   NET: 310 mL      MEDICATIONS:  acetaminophen     Tablet .. 650 milliGRAM(s) Oral every 6 hours PRN  atorvastatin 20 milliGRAM(s) Oral at bedtime  dexAMETHasone  Injectable 2 milliGRAM(s) IV Push every 6 hours  dextrose 40% Gel 15 Gram(s) Oral once  dextrose 5%. 1000 milliLiter(s) (50 mL/Hr) IV Continuous <Continuous>  dextrose 5%. 1000 milliLiter(s) (100 mL/Hr) IV Continuous <Continuous>  dextrose 50% Injectable 25 Gram(s) IV Push once  dextrose 50% Injectable 12.5 Gram(s) IV Push once  dextrose 50% Injectable 25 Gram(s) IV Push once  gabapentin 600 milliGRAM(s) Oral at bedtime  glucagon  Injectable 1 milliGRAM(s) IntraMuscular once  hydrALAZINE Injectable 10 milliGRAM(s) IV Push every 2 hours PRN  influenza   Vaccine 0.5 milliLiter(s) IntraMuscular once  insulin lispro (ADMELOG) corrective regimen sliding scale   SubCutaneous Before meals and at bedtime  metoclopramide Injectable 10 milliGRAM(s) IV Push every 6 hours PRN  ondansetron Injectable 4 milliGRAM(s) IV Push every 6 hours PRN  OXcarbazepine 150 milliGRAM(s) Oral two times a day  pantoprazole  Injectable 40 milliGRAM(s) IV Push daily  prochlorperazine   Injectable 10 milliGRAM(s) IV Push every 6 hours PRN  scopolamine 1 mG/72 Hr(s) Patch 1 Patch Transdermal every 72 hours  senna 2 Tablet(s) Oral at bedtime  sodium chloride 0.9%. 1000 milliLiter(s) (75 mL/Hr) IV Continuous <Continuous>  sucralfate 1 Gram(s) Oral four times a day               LABS:                             10.0   14.10 )-----------( 143      ( 21 Jan 2022 06:09 )             31.3              11.5   13.78 )-----------( 167      ( 20 Jan 2022 05:40 )             33.8     01-21 01-21    134<L>  |  102  |  10  ----------------------------<  199<H>  4.8   |  22  |  0.54    Ca    8.4      21 Jan 2022 07:11  Phos  see note     01-21  Mg     see note     01-21    TPro  7.1  /  Alb  4.2  /  TBili  0.2  /  DBili  x   /  AST  41<H>  /  ALT  43  /  AlkPhos  133<H>  01-19          TPro  7.1  /  Alb  4.2  /  TBili  0.2  /  DBili  x   /  AST  41<H>  /  ALT  43  /  AlkPhos  133<H>  01-19 01-20    141  |  109<H>  |  8   ----------------------------<  175<H>  3.1<L>   |  21<L>  |  0.50    Ca    7.4<L>      20 Jan 2022 05:40  Phos  3.1     01-20  Mg     1.5     01-20    TPro  7.1  /  Alb  4.2  /  TBili  0.2  /  DBili  x   /  AST  41<H>  /  ALT  43  /  AlkPhos  133<H>  01-19    LIVER FUNCTIONS - ( 19 Jan 2022 17:20 )  Alb: 4.2 g/dL / Pro: 7.1 g/dL / ALK PHOS: 133 U/L / ALT: 43 U/L / AST: 41 U/L / GGT: x        PHYSICAL EXAM:  GEN: Calm  NEURO: Alert, awake, oriented x3, following commands, opens eyes spontaneously, speech intact, face symmetric. CNII-XII intact. +R facial numbness in V2/V3 distribution. PERRL, EOMI.   +b/l horizontal nystagmus. No pronator drift. Moving all extremities with 5/5 strength throughout.   CV: RRR +S1/S2  PULM: CTAB  GI: Abd soft, NT/ND  EXT: Ext warm, dry, nontender          HPI:  62 y/o Female with trigeminal neuralgia presenting with right sided facial pain and numbness in right V2/V3 distribution with failed medical management, planned for right microvascular decompression of trigeminal nerve with lumbar drain  placement. (19 Jan 2022 21:13)    OVERNIGHT EVENTS:     Hospital Course:   1/19: POD0 s/p right microvascular decompression of trigeminal nerve for trigeminal neuralgia with lumbar drain placement   1/20: POD1. TONIA o/n neuro stable. Nausea post op; on compazine, scopolamine patch, zofran. LD draining 10cc/hr    ROS: Still complains of nausea./ dizziness  Remainder of systems negative    ICU Vital Signs Last 24 Hrs  ICU Vital Signs Last 24 Hrs  T(C): 36.5 (21 Jan 2022 05:30), Max: 37.1 (20 Jan 2022 09:05)  T(F): 97.7 (21 Jan 2022 05:30), Max: 98.7 (20 Jan 2022 09:05)  HR: 86 (21 Jan 2022 07:00) (61 - 103)  BP: 155/78 (21 Jan 2022 07:00) (127/69 - 164/74)  BP(mean): 109 (21 Jan 2022 07:00) (89 - 110)  RR: 16 (21 Jan 2022 07:00) (13 - 39)  SpO2: 95% (21 Jan 2022 07:00) (93% - 97%)    TUBES/LINES:  [] Heard  [x] Lumbar Drain    DIET:  [x] Mechanical  [] Tube feeds      20 Jan 2022 07:01  -  21 Jan 2022 07:00  --------------------------------------------------------  IN:    IV PiggyBack: 100 mL    Lactated Ringers: 375 mL    Lactated Ringers w/ Additives: 375 mL    Oral Fluid: 660 mL    sodium chloride 0.9%: 900 mL    sodium chloride 3%: 180 mL  Total IN: 2590 mL    OUT:    Drain (mL): 271 mL    Indwelling Catheter - Urethral (mL): 50 mL    Intermittent Catheterization - Urethral (mL): 280 mL    Voided (mL): 800 mL  Total OUT: 1401 mL    Total NET: 1189 mL      21 Jan 2022 07:01  -  21 Jan 2022 07:33  --------------------------------------------------------  IN:    Lactated Ringers: 75 mL  Total IN: 75 mL    OUT:  Total OUT: 0 mL    Total NET: 75 mL          01-19-22 @ 07:01  -  01-20-22 @ 06:33   NET: 310 mL      MEDICATIONS:  acetaminophen     Tablet .. 650 milliGRAM(s) Oral every 6 hours PRN  atorvastatin 20 milliGRAM(s) Oral at bedtime  dexAMETHasone  Injectable 2 milliGRAM(s) IV Push every 6 hours  dextrose 40% Gel 15 Gram(s) Oral once  dextrose 5%. 1000 milliLiter(s) (50 mL/Hr) IV Continuous <Continuous>  dextrose 5%. 1000 milliLiter(s) (100 mL/Hr) IV Continuous <Continuous>  dextrose 50% Injectable 25 Gram(s) IV Push once  dextrose 50% Injectable 12.5 Gram(s) IV Push once  dextrose 50% Injectable 25 Gram(s) IV Push once  gabapentin 600 milliGRAM(s) Oral at bedtime  glucagon  Injectable 1 milliGRAM(s) IntraMuscular once  hydrALAZINE Injectable 10 milliGRAM(s) IV Push every 2 hours PRN  influenza   Vaccine 0.5 milliLiter(s) IntraMuscular once  insulin lispro (ADMELOG) corrective regimen sliding scale   SubCutaneous Before meals and at bedtime  metoclopramide Injectable 10 milliGRAM(s) IV Push every 6 hours PRN  ondansetron Injectable 4 milliGRAM(s) IV Push every 6 hours PRN  OXcarbazepine 150 milliGRAM(s) Oral two times a day  pantoprazole  Injectable 40 milliGRAM(s) IV Push daily  prochlorperazine   Injectable 10 milliGRAM(s) IV Push every 6 hours PRN  scopolamine 1 mG/72 Hr(s) Patch 1 Patch Transdermal every 72 hours  senna 2 Tablet(s) Oral at bedtime  sodium chloride 0.9%. 1000 milliLiter(s) (75 mL/Hr) IV Continuous <Continuous>  sucralfate 1 Gram(s) Oral four times a day    Home Medications:  gabapentin 600 mg oral tablet: 1 tab(s) orally once a day (at bedtime) (19 Jan 2022 06:29)  omeprazole 40 mg oral delayed release capsule: 1 cap(s) orally once a day (19 Jan 2022 06:29)  OXcarbazepine 150 mg oral tablet: 1 tab(s) orally 2 times a day (19 Jan 2022 06:29)  rosuvastatin 5 mg oral tablet: 1 tab(s) orally once a day (19 Jan 2022 06:29)  sucralfate 1 g oral tablet: 1 tab(s) orally 4 times a day (before meals and at bedtime) (19 Jan 2022 06:29)             LABS:                             10.0   14.10 )-----------( 143      ( 21 Jan 2022 06:09 )             31.3              11.5   13.78 )-----------( 167      ( 20 Jan 2022 05:40 )             33.8     01-21 01-21    134<L>  |  102  |  10  ----------------------------<  199<H>  4.8   |  22  |  0.54    Ca    8.4      21 Jan 2022 07:11  Phos  see note     01-21  Mg     see note     01-21    TPro  7.1  /  Alb  4.2  /  TBili  0.2  /  DBili  x   /  AST  41<H>  /  ALT  43  /  AlkPhos  133<H>  01-19          TPro  7.1  /  Alb  4.2  /  TBili  0.2  /  DBili  x   /  AST  41<H>  /  ALT  43  /  AlkPhos  133<H>  01-19 01-20    141  |  109<H>  |  8   ----------------------------<  175<H>  3.1<L>   |  21<L>  |  0.50    Ca    7.4<L>      20 Jan 2022 05:40  Phos  3.1     01-20  Mg     1.5     01-20    TPro  7.1  /  Alb  4.2  /  TBili  0.2  /  DBili  x   /  AST  41<H>  /  ALT  43  /  AlkPhos  133<H>  01-19    LIVER FUNCTIONS - ( 19 Jan 2022 17:20 )  Alb: 4.2 g/dL / Pro: 7.1 g/dL / ALK PHOS: 133 U/L / ALT: 43 U/L / AST: 41 U/L / GGT: x        PHYSICAL EXAM:  GEN: Calm  NEURO: Alert, awake, oriented x3, following commands, opens eyes spontaneously, speech intact, face symmetric. CNII-XII intact. +R facial numbness in V2/V3 distribution. PERRL, EOMI.   +b/l horizontal nystagmus. No pronator drift. Moving all extremities with 5/5 strength throughout.   CV: RRR +S1/S2  PULM: CTAB  GI: Abd soft, NT/ND  EXT: Ext warm, dry, nontender

## 2022-01-21 NOTE — PROVIDER CONTACT NOTE (OTHER) - DATE AND TIME:
19-Jan-2022 18:00
20-Jan-2022 17:00
20-Jan-2022 18:39
19-Jan-2022 16:35
19-Jan-2022 17:20
20-Jan-2022
20-Jan-2022 19:00
21-Jan-2022
19-Jan-2022 19:05
19-Jan-2022 21:00

## 2022-01-22 LAB
ANION GAP SERPL CALC-SCNC: 10 MMOL/L — SIGNIFICANT CHANGE UP (ref 5–17)
ANION GAP SERPL CALC-SCNC: 10 MMOL/L — SIGNIFICANT CHANGE UP (ref 5–17)
ANION GAP SERPL CALC-SCNC: 9 MMOL/L — SIGNIFICANT CHANGE UP (ref 5–17)
BUN SERPL-MCNC: 10 MG/DL — SIGNIFICANT CHANGE UP (ref 7–23)
BUN SERPL-MCNC: 11 MG/DL — SIGNIFICANT CHANGE UP (ref 7–23)
BUN SERPL-MCNC: 9 MG/DL — SIGNIFICANT CHANGE UP (ref 7–23)
CALCIUM SERPL-MCNC: 8 MG/DL — LOW (ref 8.4–10.5)
CALCIUM SERPL-MCNC: 8.2 MG/DL — LOW (ref 8.4–10.5)
CALCIUM SERPL-MCNC: 8.3 MG/DL — LOW (ref 8.4–10.5)
CHLORIDE SERPL-SCNC: 100 MMOL/L — SIGNIFICANT CHANGE UP (ref 96–108)
CHLORIDE SERPL-SCNC: 101 MMOL/L — SIGNIFICANT CHANGE UP (ref 96–108)
CHLORIDE SERPL-SCNC: 101 MMOL/L — SIGNIFICANT CHANGE UP (ref 96–108)
CO2 SERPL-SCNC: 23 MMOL/L — SIGNIFICANT CHANGE UP (ref 22–31)
CO2 SERPL-SCNC: 25 MMOL/L — SIGNIFICANT CHANGE UP (ref 22–31)
CO2 SERPL-SCNC: 25 MMOL/L — SIGNIFICANT CHANGE UP (ref 22–31)
CREAT SERPL-MCNC: 0.55 MG/DL — SIGNIFICANT CHANGE UP (ref 0.5–1.3)
CREAT SERPL-MCNC: 0.58 MG/DL — SIGNIFICANT CHANGE UP (ref 0.5–1.3)
CREAT SERPL-MCNC: 0.6 MG/DL — SIGNIFICANT CHANGE UP (ref 0.5–1.3)
GLUCOSE BLDC GLUCOMTR-MCNC: 117 MG/DL — HIGH (ref 70–99)
GLUCOSE BLDC GLUCOMTR-MCNC: 121 MG/DL — HIGH (ref 70–99)
GLUCOSE BLDC GLUCOMTR-MCNC: 123 MG/DL — HIGH (ref 70–99)
GLUCOSE BLDC GLUCOMTR-MCNC: 133 MG/DL — HIGH (ref 70–99)
GLUCOSE SERPL-MCNC: 136 MG/DL — HIGH (ref 70–99)
GLUCOSE SERPL-MCNC: 142 MG/DL — HIGH (ref 70–99)
GLUCOSE SERPL-MCNC: 154 MG/DL — HIGH (ref 70–99)
HCT VFR BLD CALC: 34.4 % — LOW (ref 34.5–45)
HGB BLD-MCNC: 11.2 G/DL — LOW (ref 11.5–15.5)
MAGNESIUM SERPL-MCNC: 1.8 MG/DL — SIGNIFICANT CHANGE UP (ref 1.6–2.6)
MCHC RBC-ENTMCNC: 30.4 PG — SIGNIFICANT CHANGE UP (ref 27–34)
MCHC RBC-ENTMCNC: 32.6 GM/DL — SIGNIFICANT CHANGE UP (ref 32–36)
MCV RBC AUTO: 93.2 FL — SIGNIFICANT CHANGE UP (ref 80–100)
NRBC # BLD: 0 /100 WBCS — SIGNIFICANT CHANGE UP (ref 0–0)
PHOSPHATE SERPL-MCNC: 3.2 MG/DL — SIGNIFICANT CHANGE UP (ref 2.5–4.5)
PLATELET # BLD AUTO: 167 K/UL — SIGNIFICANT CHANGE UP (ref 150–400)
POTASSIUM SERPL-MCNC: 4.2 MMOL/L — SIGNIFICANT CHANGE UP (ref 3.5–5.3)
POTASSIUM SERPL-MCNC: 4.3 MMOL/L — SIGNIFICANT CHANGE UP (ref 3.5–5.3)
POTASSIUM SERPL-MCNC: 4.7 MMOL/L — SIGNIFICANT CHANGE UP (ref 3.5–5.3)
POTASSIUM SERPL-SCNC: 4.2 MMOL/L — SIGNIFICANT CHANGE UP (ref 3.5–5.3)
POTASSIUM SERPL-SCNC: 4.3 MMOL/L — SIGNIFICANT CHANGE UP (ref 3.5–5.3)
POTASSIUM SERPL-SCNC: 4.7 MMOL/L — SIGNIFICANT CHANGE UP (ref 3.5–5.3)
RBC # BLD: 3.69 M/UL — LOW (ref 3.8–5.2)
RBC # FLD: 13.8 % — SIGNIFICANT CHANGE UP (ref 10.3–14.5)
SODIUM SERPL-SCNC: 132 MMOL/L — LOW (ref 135–145)
SODIUM SERPL-SCNC: 136 MMOL/L — SIGNIFICANT CHANGE UP (ref 135–145)
SODIUM SERPL-SCNC: 136 MMOL/L — SIGNIFICANT CHANGE UP (ref 135–145)
WBC # BLD: 12.93 K/UL — HIGH (ref 3.8–10.5)
WBC # FLD AUTO: 12.93 K/UL — HIGH (ref 3.8–10.5)

## 2022-01-22 PROCEDURE — 99024 POSTOP FOLLOW-UP VISIT: CPT

## 2022-01-22 PROCEDURE — 99233 SBSQ HOSP IP/OBS HIGH 50: CPT

## 2022-01-22 PROCEDURE — 70450 CT HEAD/BRAIN W/O DYE: CPT | Mod: 26

## 2022-01-22 RX ORDER — MAGNESIUM SULFATE 500 MG/ML
2 VIAL (ML) INJECTION ONCE
Refills: 0 | Status: COMPLETED | OUTPATIENT
Start: 2022-01-22 | End: 2022-01-22

## 2022-01-22 RX ADMIN — ONDANSETRON 4 MILLIGRAM(S): 8 TABLET, FILM COATED ORAL at 06:29

## 2022-01-22 RX ADMIN — SCOPALAMINE 1 PATCH: 1 PATCH, EXTENDED RELEASE TRANSDERMAL at 17:30

## 2022-01-22 RX ADMIN — Medication 10 MILLIGRAM(S): at 08:13

## 2022-01-22 RX ADMIN — OXYCODONE AND ACETAMINOPHEN 1 TABLET(S): 5; 325 TABLET ORAL at 14:25

## 2022-01-22 RX ADMIN — SODIUM CHLORIDE 2 GRAM(S): 9 INJECTION INTRAMUSCULAR; INTRAVENOUS; SUBCUTANEOUS at 06:30

## 2022-01-22 RX ADMIN — SCOPALAMINE 1 PATCH: 1 PATCH, EXTENDED RELEASE TRANSDERMAL at 17:16

## 2022-01-22 RX ADMIN — OXYCODONE AND ACETAMINOPHEN 2 TABLET(S): 5; 325 TABLET ORAL at 11:40

## 2022-01-22 RX ADMIN — OXYCODONE AND ACETAMINOPHEN 2 TABLET(S): 5; 325 TABLET ORAL at 18:00

## 2022-01-22 RX ADMIN — OXYCODONE AND ACETAMINOPHEN 2 TABLET(S): 5; 325 TABLET ORAL at 10:55

## 2022-01-22 RX ADMIN — SENNA PLUS 2 TABLET(S): 8.6 TABLET ORAL at 21:44

## 2022-01-22 RX ADMIN — Medication 10 MILLIGRAM(S): at 00:14

## 2022-01-22 RX ADMIN — ONDANSETRON 4 MILLIGRAM(S): 8 TABLET, FILM COATED ORAL at 17:15

## 2022-01-22 RX ADMIN — SODIUM CHLORIDE 2 GRAM(S): 9 INJECTION INTRAMUSCULAR; INTRAVENOUS; SUBCUTANEOUS at 12:21

## 2022-01-22 RX ADMIN — GABAPENTIN 600 MILLIGRAM(S): 400 CAPSULE ORAL at 22:40

## 2022-01-22 RX ADMIN — OXCARBAZEPINE 150 MILLIGRAM(S): 300 TABLET, FILM COATED ORAL at 06:29

## 2022-01-22 RX ADMIN — SCOPALAMINE 1 PATCH: 1 PATCH, EXTENDED RELEASE TRANSDERMAL at 19:02

## 2022-01-22 RX ADMIN — OXYCODONE AND ACETAMINOPHEN 2 TABLET(S): 5; 325 TABLET ORAL at 04:14

## 2022-01-22 RX ADMIN — Medication 10 MILLIGRAM(S): at 02:11

## 2022-01-22 RX ADMIN — OXYCODONE AND ACETAMINOPHEN 2 TABLET(S): 5; 325 TABLET ORAL at 04:44

## 2022-01-22 RX ADMIN — OXCARBAZEPINE 150 MILLIGRAM(S): 300 TABLET, FILM COATED ORAL at 17:15

## 2022-01-22 RX ADMIN — Medication 25 GRAM(S): at 07:40

## 2022-01-22 RX ADMIN — Medication 1 GRAM(S): at 06:29

## 2022-01-22 RX ADMIN — SODIUM CHLORIDE 2 GRAM(S): 9 INJECTION INTRAMUSCULAR; INTRAVENOUS; SUBCUTANEOUS at 17:16

## 2022-01-22 RX ADMIN — SODIUM CHLORIDE 30 MILLILITER(S): 5 INJECTION, SOLUTION INTRAVENOUS at 00:14

## 2022-01-22 RX ADMIN — CHLORHEXIDINE GLUCONATE 1 APPLICATION(S): 213 SOLUTION TOPICAL at 06:10

## 2022-01-22 RX ADMIN — OXYCODONE AND ACETAMINOPHEN 1 TABLET(S): 5; 325 TABLET ORAL at 23:49

## 2022-01-22 RX ADMIN — Medication 10 MILLIGRAM(S): at 08:50

## 2022-01-22 RX ADMIN — OXYCODONE AND ACETAMINOPHEN 2 TABLET(S): 5; 325 TABLET ORAL at 17:15

## 2022-01-22 RX ADMIN — Medication 1 GRAM(S): at 17:15

## 2022-01-22 RX ADMIN — PANTOPRAZOLE SODIUM 40 MILLIGRAM(S): 20 TABLET, DELAYED RELEASE ORAL at 12:21

## 2022-01-22 RX ADMIN — Medication 10 MILLIGRAM(S): at 14:25

## 2022-01-22 RX ADMIN — OXYCODONE AND ACETAMINOPHEN 1 TABLET(S): 5; 325 TABLET ORAL at 15:00

## 2022-01-22 RX ADMIN — Medication 1 GRAM(S): at 12:21

## 2022-01-22 RX ADMIN — ATORVASTATIN CALCIUM 20 MILLIGRAM(S): 80 TABLET, FILM COATED ORAL at 21:45

## 2022-01-22 RX ADMIN — ONDANSETRON 4 MILLIGRAM(S): 8 TABLET, FILM COATED ORAL at 12:21

## 2022-01-22 RX ADMIN — SCOPALAMINE 1 PATCH: 1 PATCH, EXTENDED RELEASE TRANSDERMAL at 06:16

## 2022-01-22 RX ADMIN — Medication 10 MILLIGRAM(S): at 21:44

## 2022-01-22 NOTE — PROGRESS NOTE ADULT - SUBJECTIVE AND OBJECTIVE BOX
HPI:  this info taken from patient's chart : " /..  61y Female with trigeminal neuralgia presenting with right sided facial pain  and numbness in right V2/V3 distribution with failed medical management, planned for   right microvascular decompression of trigeminal nerve with lumbar drain  placement."      (2022 21:13)      Hospital Course:   : POD0 s/p right microvascular decompression of trigeminal nerve for trigeminal neuralgia with lumbar drain placement   : POD1. TONIA o/n neuro stable. nausea post op, on compazine, scopolamine patch, zofran. LD draining 10cc/hr increased to 15/hr but experienced worsening headache and back pain, decreased back to 10cc/hr.   : POD2 dilaudid IV given o/n for right low back pain, neuro stable, 200 cc bolus 3% given for hyponatremia, Dilaudid 0.25 given for pain.   : POD3, overnight 3% at 30 and salt tabs started for hyponatremia. 1L fluid restriction in place. neuro stable      Vital Signs Last 24 Hrs  T(C): 36.9 (2022 22:15), Max: 36.9 (2022 14:23)  T(F): 98.5 (2022 22:15), Max: 98.5 (2022 22:15)  HR: 72 (2022 00:00) (61 - 93)  BP: 156/77 (2022 00:00) (121/64 - 180/78)  BP(mean): 110 (2022 00:00) (87 - 114)  RR: 13 (2022 00:00) (12 - 29)  SpO2: 95% (2022 00:00) (93% - 98%)    I&O's Detail    2022 07:01  -  2022 07:00  --------------------------------------------------------  IN:    IV PiggyBack: 100 mL    Lactated Ringers: 375 mL    Lactated Ringers w/ Additives: 375 mL    Oral Fluid: 660 mL    sodium chloride 0.9%: 900 mL    sodium chloride 3%: 180 mL  Total IN: 2590 mL    OUT:    Drain (mL): 271 mL    Indwelling Catheter - Urethral (mL): 50 mL    Intermittent Catheterization - Urethral (mL): 280 mL    Voided (mL): 800 mL  Total OUT: 1401 mL    Total NET: 1189 mL      2022 07:01  -  2022 00:30  --------------------------------------------------------  IN:    Lactated Ringers: 75 mL    Lactated Ringers: 520 mL    Oral Fluid: 400 mL    sodium chloride 3%: 60 mL  Total IN: 1055 mL    OUT:    Drain (mL): 50 mL    Voided (mL): 600 mL  Total OUT: 650 mL    Total NET: 405 mL        I&O's Summary    2022 07:  -  2022 07:00  --------------------------------------------------------  IN: 2590 mL / OUT: 1401 mL / NET: 1189 mL    2022 07:01  -  2022 00:30  --------------------------------------------------------  IN: 1055 mL / OUT: 650 mL / NET: 405 mL        PHYSICAL EXAM:  GEN: laying in bed, appears well, NAD  NEURO: AOx3. FC, OE spont, speech intact, face symmetric. CNII-XII intact. +R facial numbness in V1/V2/V3 distribution. PERRL, EOMI. +b/l horizontal nystagmus. No pronator drift. MAEx4. 5/5 strength throughout.   CV: RRR +S1/S2  PULM: CTAB  GI: Abd soft, NT/ND  EXT: ext warm, dry, nontender  lumbar drain site c/d/i    TUBES/LINES:  [] CVC  [x] A-line  [x] Lumbar Drain - clamped  [] Ventriculostomy  [] Other    DIET:  [] NPO  [x] Mechanical  [] Tube feeds    LABS:                        10.0   14.10 )-----------( 143      ( 2022 06:09 )             31.3         129<L>  |  96  |  13  ----------------------------<  158<H>  4.4   |  24  |  0.58    Ca    8.8      2022 19:27  Phos  see note       Mg     see note             Urinalysis Basic - ( 2022 02:46 )    Color: x / Appearance: x / S.020 / pH: x  Gluc: x / Ketone: x  / Bili: x / Urobili: x   Blood: x / Protein: x / Nitrite: x   Leuk Esterase: x / RBC: x / WBC x   Sq Epi: x / Non Sq Epi: x / Bacteria: x          CAPILLARY BLOOD GLUCOSE      POCT Blood Glucose.: 181 mg/dL (2022 22:27)  POCT Blood Glucose.: 164 mg/dL (2022 16:30)  POCT Blood Glucose.: 152 mg/dL (2022 11:58)  POCT Blood Glucose.: 155 mg/dL (2022 06:25)      Drug Levels: [] N/A    CSF Analysis: [] N/A      Allergies    No Known Allergies    Intolerances    dairy products (Stomach Upset)    MEDICATIONS:  Antibiotics:    Neuro:  acetaminophen     Tablet .. 650 milliGRAM(s) Oral every 6 hours PRN  gabapentin 600 milliGRAM(s) Oral at bedtime  metoclopramide Injectable 10 milliGRAM(s) IV Push every 6 hours  ondansetron Injectable 4 milliGRAM(s) IV Push every 6 hours  OXcarbazepine 150 milliGRAM(s) Oral two times a day  oxycodone    5 mG/acetaminophen 325 mG 1 Tablet(s) Oral every 6 hours PRN  oxycodone    5 mG/acetaminophen 325 mG 2 Tablet(s) Oral every 6 hours PRN  prochlorperazine   Injectable 10 milliGRAM(s) IV Push every 6 hours PRN  scopolamine 1 mG/72 Hr(s) Patch 1 Patch Transdermal every 72 hours    Anticoagulation:    OTHER:  atorvastatin 20 milliGRAM(s) Oral at bedtime  chlorhexidine 2% Cloths 1 Application(s) Topical <User Schedule>  dextrose 40% Gel 15 Gram(s) Oral once  dextrose 50% Injectable 25 Gram(s) IV Push once  dextrose 50% Injectable 12.5 Gram(s) IV Push once  dextrose 50% Injectable 25 Gram(s) IV Push once  glucagon  Injectable 1 milliGRAM(s) IntraMuscular once  hydrALAZINE Injectable 10 milliGRAM(s) IV Push every 2 hours PRN  influenza   Vaccine 0.5 milliLiter(s) IntraMuscular once  insulin lispro (ADMELOG) corrective regimen sliding scale   SubCutaneous Before meals and at bedtime  pantoprazole  Injectable 40 milliGRAM(s) IV Push daily  senna 2 Tablet(s) Oral at bedtime  sucralfate 1 Gram(s) Oral four times a day    IVF:  dextrose 5%. 1000 milliLiter(s) IV Continuous <Continuous>  dextrose 5%. 1000 milliLiter(s) IV Continuous <Continuous>  sodium chloride 2 Gram(s) Oral every 6 hours  sodium chloride 3%. 500 milliLiter(s) IV Continuous <Continuous>    CULTURES:    RADIOLOGY & ADDITIONAL TESTS:    G50.0    Handoff    HTN (hypertension)    High cholesterol    Trigeminal neuralgia    Diverticulosis    Hiatal hernia    DM (diabetes mellitus)    Thyroid goiter    KORTNEY on CPAP    No pertinent past medical history    HTN (hypertension)    High cholesterol    Trigeminal neuralgia    Diverticulosis    Trigeminal neuralgia    Trigeminal neuralgia    Trigeminal neuralgia    Decompression, cranial nerve, microvascular    Microvascular decompression of trigeminal nerve    No significant past surgical history    History of cholecystectomy    H/O bilateral breast reduction surgery    Status post gamma knife treatment    No significant past surgical history    [email or web address]        ASSESSMENT:  61y Female with trigeminal neuralgia presenting with right sided facial pain and numbness in right V2/V3 distribution with failed medical management, s/p right microvascular decompression of trigeminal nerve with lumbar drain placement (22).      NEURO:  -neuro/vitals q1hr  -pain control prn    -decadron completed  -LD clamped   - cont home trileptal, gabapentin    CARDIOVASCULAR:  -SBP goal 100-150  -QTC : 418    PULMONARY:  -room air, satting well   - hx of KORTNEY, sat goal > 92% ok    RENAL:  - 3% at 30, salt tabs 2gq6  - 1L fluid restriction  -straight cath prn   -electrolytes prn     GI:  - regular diet, poor PO intake  -bowel regimen  -protonix while on decadron  - cont home sucralfate 1 qid   -nausea post op- zofran, reglan, compazine, scopolamine patch prns    HEME:  -trend h/h  -SCDs, holding sql for LD removal    ID:  - trend leukocytosis    ENDO:  -ISS    DISPOSITION:   -ICU status   -Full code  -PT/OT: pending home with no needs  -Discussed with Dr. Ferrer        Assessment:  Present when checked    []  GCS  E   V  M     Heart Failure: []Acute, [] acute on chronic , []chronic  Heart Failure:  [] Diastolic (HFpEF), [] Systolic (HFrEF), []Combined (HFpEF and HFrEF), [] RHF, [] Pulm HTN, [] Other    [] ERICA, [] ATN, [] AIN, [] other  [] CKD1, [] CKD2, [] CKD 3, [] CKD 4, [] CKD 5, []ESRD    Encephalopathy: [] Metabolic, [] Hepatic, [] toxic, [] Neurological, [] Other    Abnormal Nurtitional Status: [] malnurtition (see nutrition note), [ ]underweight: BMI < 19, [] morbid obesity: BMI >40, [] Cachexia    [] Sepsis  [] hypovolemic shock,[] cardiogenic shock, [] hemorrhagic shock, [] neuogenic shock  [] Acute Respiratory Failure  []Cerebral edema, [] Brain compression/ herniation,   [] Functional quadriplegia  [] Acute blood loss anemia

## 2022-01-22 NOTE — PROGRESS NOTE ADULT - ASSESSMENT
ASSESSMENT:  60 y/o F with trigeminal neuralgia. Status post day #2 right microvascular decompression of trigeminal nerve with lumbar drain placement (1/19/22).     PLAN:  NEURO:  Neuro checks Q4hr  Analgesia prn  LD clamped--d/c plan per neurosurgery  Continue trileptal and gabapentin per home regimen  Activity: PT/OT    CARDIOVASCULAR:  SBP goal 100-<150    PULMONARY:  Room air- maintain sat  >92%  incentive spirometry as able    RENAL: Hyponatremia started on 3% overnight, continue for goal Na 135-145  likely SIADH, fluid restriction   Trial of void  keep K>4, Mg>2      GI:  Advance diet as tolerated  On bowel regimen- senna   Protonix and sucralfate per home meds  Nausea post op- zofran, reglan- standing for 24 hours.  PRN compazine. Scopolamine patch. Monitor QTc    HEME: Thrombocytopenia  Monitor PLTs - was taking PPI at home  ( not likely source of low PLTS )  Lovenox  40mg q day- start in pm 1/22/22   SCDs    ID:  monitor for fevers    ENDO:  ISS  A1c 5.7    Disposition: ICU while LD in place ASSESSMENT:  60 y/o F with trigeminal neuralgia. Status post day #2 right microvascular decompression of trigeminal nerve with lumbar drain placement (1/19/22).     PLAN:  NEURO:  CTH this am  Neuro checks Q4hr  Analgesia prn  LD clamped--d/c plan per neurosurgery  Continue trileptal and gabapentin per home regimen  Activity: PT/OT    CARDIOVASCULAR:  SBP goal 100-<150    PULMONARY:  Room air- maintain sat  >92%  incentive spirometry as able    RENAL: Hyponatremia started on 3% overnight, continue for goal Na 135-145  likely SIADH, fluid restriction   Trial of void  keep K>4, Mg>2      GI:  Advance diet as tolerated  On bowel regimen- senna   Protonix and sucralfate per home meds  Nausea post op- zofran, reglan- standing for 24 hours.  PRN compazine. Scopolamine patch. Monitor QTc    HEME: Thrombocytopenia  Monitor PLTs - was taking PPI at home  ( not likely source of low PLTS )  Lovenox  40mg q day- start in pm 1/22/22   SCDs    ID:  monitor for fevers    ENDO:  ISS  A1c 5.7    Disposition: ICU while LD in place

## 2022-01-22 NOTE — PROVIDER CONTACT NOTE (CHANGE IN STATUS NOTIFICATION) - ASSESSMENT
rectal temp. taken 98.1 F. lumbar dsg c/d/i , no leakage. PA assessed patient at bedside. no neurological changes.

## 2022-01-22 NOTE — PROVIDER CONTACT NOTE (CHANGE IN STATUS NOTIFICATION) - SITUATION
PA called at bedside as patient is having some type of twitching on right face, head, arm while patient is awake & conversing

## 2022-01-22 NOTE — PROGRESS NOTE ADULT - SUBJECTIVE AND OBJECTIVE BOX
HPI:  62 y/o Female with trigeminal neuralgia presenting with right sided facial pain and numbness in right V2/V3 distribution with failed medical management, planned for right microvascular decompression of trigeminal nerve with lumbar drain  placement. (19 Jan 2022 21:13)    OVERNIGHT EVENTS: LD clamped     Hospital Course:   1/19: POD0 s/p right microvascular decompression of trigeminal nerve for trigeminal neuralgia with lumbar drain placement   1/20: POD1. TONIA o/n neuro stable. Nausea post op; on compazine, scopolamine patch, zofran. LD draining 10cc/hr    REVIEW OF SYSTEMS: [] Unable to Assess due to neurologic exam   [ x] All ROS addressed below are non-contributory, except:  Neuro: [ ] Headache [ ] Back pain [ ] Numbness [ ] Weakness [ ] Ataxia [x ] Dizziness [ ] Aphasia [ ] Dysarthria [ ] Visual disturbance  Resp: [ ] Shortness of breath/dyspnea [ ] Orthopnea [ ] Cough  CV: [ ] Chest pain [ ] Palpitation [ ] Lightheadedness [ ] Syncope  Renal: [ ] Thirst [ ] Edema  GI: [ x] Nausea [ ] Emesis [ ] Abdominal pain [ ] Constipation [ ] Diarrhea  Hem: [ ] Hematemesis [ ] bBright red blood per rectum  ID: [ ] Fever [ ] Chills [ ] Dysuria  ENT: [ ] Rhinorrhea    ICU Vital Signs Last 24 Hrs  reviewed    TUBES/LINES:  [] Heard  [x] Lumbar Drain    DIET:  [x] Mechanical  [] Tube feeds    MEDICATIONS: reviewed  LABS: reviewed           PHYSICAL EXAM:  GEN: Calm  NEURO: Alert, awake, oriented x3, following commands, opens eyes spontaneously, speech intact, face symmetric. CNII-XII intact. +R facial numbness in V2/V3 distribution. PERRL, EOMI.   +b/l horizontal nystagmus. No pronator drift. Moving all extremities with 5/5 strength throughout.   CV: RRR +S1/S2  PULM: CTAB  GI: Abd soft, NT/ND  EXT: Ext warm, dry, nontender

## 2022-01-22 NOTE — PROCEDURE NOTE - GENERAL PROCEDURE DETAILS
Lumbar drain incision site was prepped in a sterile fashion. Anchoring suture was removed. Lumbar drain catheter was removed in its entirety without resistance with visualization of black tip. Monocryl suture placed at incision site. No drainage noted.

## 2022-01-22 NOTE — PROVIDER CONTACT NOTE (CHANGE IN STATUS NOTIFICATION) - BACKGROUND
b/p taken 147/82. SR 98-. RR 19. Saturation 98% RA. b/p taken 147/82. SR 98-. RR 19. Saturation 98% RA. patient c/o of dizziness with nausea at 845am. PA was aware. Reglan given at 9am.

## 2022-01-23 LAB
ANION GAP SERPL CALC-SCNC: 7 MMOL/L — SIGNIFICANT CHANGE UP (ref 5–17)
ANION GAP SERPL CALC-SCNC: 8 MMOL/L — SIGNIFICANT CHANGE UP (ref 5–17)
BUN SERPL-MCNC: 8 MG/DL — SIGNIFICANT CHANGE UP (ref 7–23)
BUN SERPL-MCNC: 9 MG/DL — SIGNIFICANT CHANGE UP (ref 7–23)
CALCIUM SERPL-MCNC: 8.3 MG/DL — LOW (ref 8.4–10.5)
CALCIUM SERPL-MCNC: 8.5 MG/DL — SIGNIFICANT CHANGE UP (ref 8.4–10.5)
CHLORIDE SERPL-SCNC: 100 MMOL/L — SIGNIFICANT CHANGE UP (ref 96–108)
CHLORIDE SERPL-SCNC: 100 MMOL/L — SIGNIFICANT CHANGE UP (ref 96–108)
CO2 SERPL-SCNC: 26 MMOL/L — SIGNIFICANT CHANGE UP (ref 22–31)
CO2 SERPL-SCNC: 29 MMOL/L — SIGNIFICANT CHANGE UP (ref 22–31)
CREAT SERPL-MCNC: 0.57 MG/DL — SIGNIFICANT CHANGE UP (ref 0.5–1.3)
CREAT SERPL-MCNC: 0.6 MG/DL — SIGNIFICANT CHANGE UP (ref 0.5–1.3)
GLUCOSE BLDC GLUCOMTR-MCNC: 118 MG/DL — HIGH (ref 70–99)
GLUCOSE BLDC GLUCOMTR-MCNC: 127 MG/DL — HIGH (ref 70–99)
GLUCOSE SERPL-MCNC: 130 MG/DL — HIGH (ref 70–99)
GLUCOSE SERPL-MCNC: 144 MG/DL — HIGH (ref 70–99)
MAGNESIUM SERPL-MCNC: 1.8 MG/DL — SIGNIFICANT CHANGE UP (ref 1.6–2.6)
POTASSIUM SERPL-MCNC: 3.8 MMOL/L — SIGNIFICANT CHANGE UP (ref 3.5–5.3)
POTASSIUM SERPL-MCNC: 3.9 MMOL/L — SIGNIFICANT CHANGE UP (ref 3.5–5.3)
POTASSIUM SERPL-SCNC: 3.8 MMOL/L — SIGNIFICANT CHANGE UP (ref 3.5–5.3)
POTASSIUM SERPL-SCNC: 3.9 MMOL/L — SIGNIFICANT CHANGE UP (ref 3.5–5.3)
SODIUM SERPL-SCNC: 134 MMOL/L — LOW (ref 135–145)
SODIUM SERPL-SCNC: 136 MMOL/L — SIGNIFICANT CHANGE UP (ref 135–145)

## 2022-01-23 PROCEDURE — 99233 SBSQ HOSP IP/OBS HIGH 50: CPT

## 2022-01-23 PROCEDURE — 99024 POSTOP FOLLOW-UP VISIT: CPT

## 2022-01-23 RX ORDER — MAGNESIUM SULFATE 500 MG/ML
2 VIAL (ML) INJECTION ONCE
Refills: 0 | Status: COMPLETED | OUTPATIENT
Start: 2022-01-23 | End: 2022-01-23

## 2022-01-23 RX ORDER — OXYCODONE AND ACETAMINOPHEN 5; 325 MG/1; MG/1
1 TABLET ORAL ONCE
Refills: 0 | Status: DISCONTINUED | OUTPATIENT
Start: 2022-01-23 | End: 2022-01-23

## 2022-01-23 RX ORDER — ENOXAPARIN SODIUM 100 MG/ML
40 INJECTION SUBCUTANEOUS EVERY 12 HOURS
Refills: 0 | Status: DISCONTINUED | OUTPATIENT
Start: 2022-01-23 | End: 2022-01-25

## 2022-01-23 RX ORDER — SODIUM CHLORIDE 9 MG/ML
3 INJECTION INTRAMUSCULAR; INTRAVENOUS; SUBCUTANEOUS EVERY 6 HOURS
Refills: 0 | Status: DISCONTINUED | OUTPATIENT
Start: 2022-01-23 | End: 2022-01-24

## 2022-01-23 RX ORDER — PANTOPRAZOLE SODIUM 20 MG/1
40 TABLET, DELAYED RELEASE ORAL
Refills: 0 | Status: DISCONTINUED | OUTPATIENT
Start: 2022-01-23 | End: 2022-01-25

## 2022-01-23 RX ORDER — POLYETHYLENE GLYCOL 3350 17 G/17G
17 POWDER, FOR SOLUTION ORAL DAILY
Refills: 0 | Status: DISCONTINUED | OUTPATIENT
Start: 2022-01-23 | End: 2022-01-25

## 2022-01-23 RX ORDER — OXYCODONE HYDROCHLORIDE 5 MG/1
5 TABLET ORAL ONCE
Refills: 0 | Status: DISCONTINUED | OUTPATIENT
Start: 2022-01-23 | End: 2022-01-23

## 2022-01-23 RX ADMIN — SODIUM CHLORIDE 2 GRAM(S): 9 INJECTION INTRAMUSCULAR; INTRAVENOUS; SUBCUTANEOUS at 00:06

## 2022-01-23 RX ADMIN — OXYCODONE AND ACETAMINOPHEN 1 TABLET(S): 5; 325 TABLET ORAL at 03:21

## 2022-01-23 RX ADMIN — OXYCODONE AND ACETAMINOPHEN 1 TABLET(S): 5; 325 TABLET ORAL at 19:53

## 2022-01-23 RX ADMIN — OXYCODONE AND ACETAMINOPHEN 1 TABLET(S): 5; 325 TABLET ORAL at 20:30

## 2022-01-23 RX ADMIN — OXYCODONE AND ACETAMINOPHEN 1 TABLET(S): 5; 325 TABLET ORAL at 12:12

## 2022-01-23 RX ADMIN — OXYCODONE AND ACETAMINOPHEN 1 TABLET(S): 5; 325 TABLET ORAL at 03:51

## 2022-01-23 RX ADMIN — ENOXAPARIN SODIUM 40 MILLIGRAM(S): 100 INJECTION SUBCUTANEOUS at 23:15

## 2022-01-23 RX ADMIN — Medication 10 MILLIGRAM(S): at 03:17

## 2022-01-23 RX ADMIN — SODIUM CHLORIDE 3 GRAM(S): 9 INJECTION INTRAMUSCULAR; INTRAVENOUS; SUBCUTANEOUS at 23:15

## 2022-01-23 RX ADMIN — ENOXAPARIN SODIUM 40 MILLIGRAM(S): 100 INJECTION SUBCUTANEOUS at 12:21

## 2022-01-23 RX ADMIN — Medication 25 GRAM(S): at 09:49

## 2022-01-23 RX ADMIN — Medication 1 GRAM(S): at 23:15

## 2022-01-23 RX ADMIN — SODIUM CHLORIDE 3 GRAM(S): 9 INJECTION INTRAMUSCULAR; INTRAVENOUS; SUBCUTANEOUS at 06:42

## 2022-01-23 RX ADMIN — POLYETHYLENE GLYCOL 3350 17 GRAM(S): 17 POWDER, FOR SOLUTION ORAL at 09:49

## 2022-01-23 RX ADMIN — Medication 1 GRAM(S): at 17:45

## 2022-01-23 RX ADMIN — Medication 650 MILLIGRAM(S): at 23:15

## 2022-01-23 RX ADMIN — Medication 650 MILLIGRAM(S): at 13:30

## 2022-01-23 RX ADMIN — SENNA PLUS 2 TABLET(S): 8.6 TABLET ORAL at 22:06

## 2022-01-23 RX ADMIN — Medication 650 MILLIGRAM(S): at 12:43

## 2022-01-23 RX ADMIN — OXYCODONE AND ACETAMINOPHEN 1 TABLET(S): 5; 325 TABLET ORAL at 00:20

## 2022-01-23 RX ADMIN — ONDANSETRON 4 MILLIGRAM(S): 8 TABLET, FILM COATED ORAL at 00:06

## 2022-01-23 RX ADMIN — GABAPENTIN 600 MILLIGRAM(S): 400 CAPSULE ORAL at 22:06

## 2022-01-23 RX ADMIN — ONDANSETRON 4 MILLIGRAM(S): 8 TABLET, FILM COATED ORAL at 17:45

## 2022-01-23 RX ADMIN — ONDANSETRON 4 MILLIGRAM(S): 8 TABLET, FILM COATED ORAL at 11:34

## 2022-01-23 RX ADMIN — OXCARBAZEPINE 150 MILLIGRAM(S): 300 TABLET, FILM COATED ORAL at 17:44

## 2022-01-23 RX ADMIN — SCOPALAMINE 1 PATCH: 1 PATCH, EXTENDED RELEASE TRANSDERMAL at 05:22

## 2022-01-23 RX ADMIN — ATORVASTATIN CALCIUM 20 MILLIGRAM(S): 80 TABLET, FILM COATED ORAL at 22:06

## 2022-01-23 RX ADMIN — OXYCODONE AND ACETAMINOPHEN 1 TABLET(S): 5; 325 TABLET ORAL at 11:35

## 2022-01-23 RX ADMIN — Medication 1 GRAM(S): at 11:34

## 2022-01-23 RX ADMIN — CHLORHEXIDINE GLUCONATE 1 APPLICATION(S): 213 SOLUTION TOPICAL at 05:21

## 2022-01-23 RX ADMIN — ONDANSETRON 4 MILLIGRAM(S): 8 TABLET, FILM COATED ORAL at 06:42

## 2022-01-23 RX ADMIN — Medication 1 GRAM(S): at 00:05

## 2022-01-23 RX ADMIN — SCOPALAMINE 1 PATCH: 1 PATCH, EXTENDED RELEASE TRANSDERMAL at 18:00

## 2022-01-23 RX ADMIN — Medication 5 MILLIGRAM(S): at 09:49

## 2022-01-23 RX ADMIN — Medication 1 GRAM(S): at 06:42

## 2022-01-23 RX ADMIN — SODIUM CHLORIDE 3 GRAM(S): 9 INJECTION INTRAMUSCULAR; INTRAVENOUS; SUBCUTANEOUS at 17:44

## 2022-01-23 RX ADMIN — ONDANSETRON 4 MILLIGRAM(S): 8 TABLET, FILM COATED ORAL at 23:15

## 2022-01-23 RX ADMIN — SODIUM CHLORIDE 3 GRAM(S): 9 INJECTION INTRAMUSCULAR; INTRAVENOUS; SUBCUTANEOUS at 11:34

## 2022-01-23 RX ADMIN — OXCARBAZEPINE 150 MILLIGRAM(S): 300 TABLET, FILM COATED ORAL at 06:42

## 2022-01-23 NOTE — PROGRESS NOTE ADULT - SUBJECTIVE AND OBJECTIVE BOX
HPI:  this info taken from patient's chart : " /..  61y Female with trigeminal neuralgia presenting with right sided facial pain  and numbness in right V2/V3 distribution with failed medical management, planned for   right microvascular decompression of trigeminal nerve with lumbar drain  placement."      (2022 21:13)    Subjective: patient reports right facial numbness is improving.    Hospital Course:   : POD0 s/p right microvascular decompression of trigeminal nerve for trigeminal neuralgia with lumbar drain placement   : POD1. TONIA o/n neuro stable. nausea post op, on compazine, scopolamine patch, zofran. LD draining 10cc/hr increased to 15/hr but experienced worsening headache and back pain, decreased back to 10cc/hr.   : POD2 dilaudid IV given o/n for right low back pain, neuro stable, 200 cc bolus 3% given for hyponatremia, Dilaudid 0.25 given for pain.   : POD3, overnight 3% at 30 and salt tabs started for hyponatremia. 1L fluid restriction in place. neuro stable, episode of twitching of R arm and R face in AM, CTH stable, patient for stepdown, fluid restriction decreased to 800cc daily, US guided IV placed  : POD4, 3% dc'd overnight, neuro stable, second US guided IV placed      Vital Signs Last 24 Hrs  T(C): 37.2 (2022 21:48), Max: 37.2 (2022 18:08)  T(F): 98.9 (2022 21:48), Max: 98.9 (2022 18:08)  HR: 82 (2022 21:00) (76 - 98)  BP: 146/69 (2022 21:00) (116/64 - 166/82)  BP(mean): 99 (2022 21:00) (86 - 112)  RR: 17 (2022 21:00) (8 - 31)  SpO2: 94% (2022 21:00) (93% - 97%)    I&O's Detail    2022 07:01  -  2022 07:00  --------------------------------------------------------  IN:    Lactated Ringers: 520 mL    Lactated Ringers: 75 mL    Oral Fluid: 500 mL    sodium chloride 3%: 270 mL  Total IN: 1365 mL    OUT:    Drain (mL): 50 mL    Voided (mL): 800 mL  Total OUT: 850 mL    Total NET: 515 mL      2022 07:  -  2022 00:10  --------------------------------------------------------  IN:    Oral Fluid: 800 mL    sodium chloride 3%: 390 mL  Total IN: 1190 mL    OUT:    Voided (mL): 615 mL  Total OUT: 615 mL    Total NET: 575 mL        I&O's Summary    2022 07:01  -  2022 07:00  --------------------------------------------------------  IN: 1365 mL / OUT: 850 mL / NET: 515 mL    2022 07:01  -  2022 00:10  --------------------------------------------------------  IN: 1190 mL / OUT: 615 mL / NET: 575 mL        PHYSICAL EXAM:  GEN: laying in bed, appears well, NAD  NEURO: AOx3. FC, OE spont, speech intact, face symmetric. CNII-XII intact. +mild R facial numbness in V1/V2/V3 distribution . PERRL, EOMI. +mild b/l horizontal nystagmus. No pronator drift. MAEx4. 5/5 strength throughout.   CV: RRR +S1/S2  PULM: CTAB  GI: Abd soft, NT/ND  EXT: ext warm, dry, nontende    DIET:  [] NPO  [x] Mechanical  [] Tube feeds        LABS:                        11.2   12.93 )-----------( 167      ( 2022 05:43 )             34.4         136  |  101  |  9   ----------------------------<  154<H>  4.2   |  25  |  0.60    Ca    8.0<L>      2022 19:54  Phos  3.2       Mg     1.8             Urinalysis Basic - ( 2022 02:46 )    Color: x / Appearance: x / S.020 / pH: x  Gluc: x / Ketone: x  / Bili: x / Urobili: x   Blood: x / Protein: x / Nitrite: x   Leuk Esterase: x / RBC: x / WBC x   Sq Epi: x / Non Sq Epi: x / Bacteria: x          CAPILLARY BLOOD GLUCOSE      POCT Blood Glucose.: 123 mg/dL (2022 21:49)  POCT Blood Glucose.: 121 mg/dL (2022 17:03)  POCT Blood Glucose.: 117 mg/dL (2022 10:58)  POCT Blood Glucose.: 133 mg/dL (2022 06:25)      Drug Levels: [] N/A    CSF Analysis: [] N/A      Allergies    No Known Allergies    Intolerances    dairy products (Stomach Upset)    MEDICATIONS:  Antibiotics:    Neuro:  acetaminophen     Tablet .. 650 milliGRAM(s) Oral every 6 hours PRN  gabapentin 600 milliGRAM(s) Oral at bedtime  metoclopramide Injectable 10 milliGRAM(s) IV Push every 6 hours  ondansetron Injectable 4 milliGRAM(s) IV Push every 6 hours  OXcarbazepine 150 milliGRAM(s) Oral two times a day  oxycodone    5 mG/acetaminophen 325 mG 1 Tablet(s) Oral every 6 hours PRN  oxycodone    5 mG/acetaminophen 325 mG 2 Tablet(s) Oral every 6 hours PRN  prochlorperazine   Injectable 10 milliGRAM(s) IV Push every 6 hours PRN  scopolamine 1 mG/72 Hr(s) Patch 1 Patch Transdermal every 72 hours    Anticoagulation:    OTHER:  atorvastatin 20 milliGRAM(s) Oral at bedtime  chlorhexidine 2% Cloths 1 Application(s) Topical <User Schedule>  dextrose 40% Gel 15 Gram(s) Oral once  dextrose 50% Injectable 25 Gram(s) IV Push once  dextrose 50% Injectable 12.5 Gram(s) IV Push once  dextrose 50% Injectable 25 Gram(s) IV Push once  glucagon  Injectable 1 milliGRAM(s) IntraMuscular once  hydrALAZINE Injectable 10 milliGRAM(s) IV Push every 2 hours PRN  influenza   Vaccine 0.5 milliLiter(s) IntraMuscular once  insulin lispro (ADMELOG) corrective regimen sliding scale   SubCutaneous Before meals and at bedtime  pantoprazole  Injectable 40 milliGRAM(s) IV Push daily  senna 2 Tablet(s) Oral at bedtime  sucralfate 1 Gram(s) Oral four times a day    IVF:  dextrose 5%. 1000 milliLiter(s) IV Continuous <Continuous>  dextrose 5%. 1000 milliLiter(s) IV Continuous <Continuous>  sodium chloride 2 Gram(s) Oral every 6 hours    CULTURES:    RADIOLOGY & ADDITIONAL TESTS:    G50.0    Handoff    HTN (hypertension)    High cholesterol    Trigeminal neuralgia    Diverticulosis    Hiatal hernia    DM (diabetes mellitus)    Thyroid goiter    KORTNEY on CPAP    No pertinent past medical history    HTN (hypertension)    High cholesterol    Trigeminal neuralgia    Diverticulosis    Trigeminal neuralgia    Trigeminal neuralgia    Trigeminal neuralgia    Decompression, cranial nerve, microvascular    Microvascular decompression of trigeminal nerve    No significant past surgical history    History of cholecystectomy    H/O bilateral breast reduction surgery    Status post gamma knife treatment    No significant past surgical history    [email or web address]        ASSESSMENT:  61y Female with trigeminal neuralgia presenting with right sided facial pain and numbness in right V2/V3 distribution with failed medical management, s/p right microvascular decompression of trigeminal nerve with lumbar drain placement (22). LD dc'd       NEURO:  -neuro/vitals q4hr  -pain control prn    -decadron completed  -LD dc'   -cont home trileptal, gabapentin  -CTH  stable, no hemorrhage    CARDIOVASCULAR:  -SBP goal 100-150  -QTC : 416    PULMONARY:  -room air, satting well   -hx of KORTNEY, sat goal > 92% ok    RENAL:  - 3% dc'd, salt tabs 2gq6  - 800cc fluid restriction  -straight cath prn   -electrolytes prn     GI:  - regular diet, poor PO intake  -bowel regimen  -protonix while on decadron  - cont home sucralfate 1 qid   -nausea post op- zofran, reglan, compazine, scopolamine patch prns    HEME:  -trend h/h  -SCDs, SQL held for LD removal    ID:  - trend leukocytosis    ENDO:  -ISS    DISPOSITION:   -SDU status   -Full code  -PT/OT: pending home with no needs  -Discussed with Dr. Ferrer        Assessment:  Present when checked    []  GCS  E   V  M     Heart Failure: []Acute, [] acute on chronic , []chronic  Heart Failure:  [] Diastolic (HFpEF), [] Systolic (HFrEF), []Combined (HFpEF and HFrEF), [] RHF, [] Pulm HTN, [] Other    [] ERICA, [] ATN, [] AIN, [] other  [] CKD1, [] CKD2, [] CKD 3, [] CKD 4, [] CKD 5, []ESRD    Encephalopathy: [] Metabolic, [] Hepatic, [] toxic, [] Neurological, [] Other    Abnormal Nurtitional Status: [] malnurtition (see nutrition note), [ ]underweight: BMI < 19, [] morbid obesity: BMI >40, [] Cachexia    [] Sepsis  [] hypovolemic shock,[] cardiogenic shock, [] hemorrhagic shock, [] neuogenic shock  [] Acute Respiratory Failure  []Cerebral edema, [] Brain compression/ herniation,   [] Functional quadriplegia  [] Acute blood loss anemia   HPI:  this info taken from patient's chart : " /..  61y Female with trigeminal neuralgia presenting with right sided facial pain  and numbness in right V2/V3 distribution with failed medical management, planned for   right microvascular decompression of trigeminal nerve with lumbar drain  placement."      (2022 21:13)    Subjective: patient reports right facial numbness is improving.    Hospital Course:   : POD0 s/p right microvascular decompression of trigeminal nerve for trigeminal neuralgia with lumbar drain placement   : POD1. TONIA o/n neuro stable. nausea post op, on compazine, scopolamine patch, zofran. LD draining 10cc/hr increased to 15/hr but experienced worsening headache and back pain, decreased back to 10cc/hr.   : POD2 dilaudid IV given o/n for right low back pain, neuro stable, 200 cc bolus 3% given for hyponatremia, Dilaudid 0.25 given for pain.   : POD3, overnight 3% at 30 and salt tabs started for hyponatremia. 1L fluid restriction in place. neuro stable, episode of twitching of R arm and R face in AM, CTH stable, patient for stepdown, fluid restriction decreased to 800cc daily, US guided IV placed  : POD4, 3% dc'd overnight, neuro stable, second US guided IV placed      Vital Signs Last 24 Hrs  T(C): 37.2 (2022 21:48), Max: 37.2 (2022 18:08)  T(F): 98.9 (2022 21:48), Max: 98.9 (2022 18:08)  HR: 82 (2022 21:00) (76 - 98)  BP: 146/69 (2022 21:00) (116/64 - 166/82)  BP(mean): 99 (2022 21:00) (86 - 112)  RR: 17 (2022 21:00) (8 - 31)  SpO2: 94% (2022 21:00) (93% - 97%)    I&O's Detail    2022 07:01  -  2022 07:00  --------------------------------------------------------  IN:    Lactated Ringers: 520 mL    Lactated Ringers: 75 mL    Oral Fluid: 500 mL    sodium chloride 3%: 270 mL  Total IN: 1365 mL    OUT:    Drain (mL): 50 mL    Voided (mL): 800 mL  Total OUT: 850 mL    Total NET: 515 mL      2022 07:  -  2022 00:10  --------------------------------------------------------  IN:    Oral Fluid: 800 mL    sodium chloride 3%: 390 mL  Total IN: 1190 mL    OUT:    Voided (mL): 615 mL  Total OUT: 615 mL    Total NET: 575 mL        I&O's Summary    2022 07:01  -  2022 07:00  --------------------------------------------------------  IN: 1365 mL / OUT: 850 mL / NET: 515 mL    2022 07:01  -  2022 00:10  --------------------------------------------------------  IN: 1190 mL / OUT: 615 mL / NET: 575 mL        PHYSICAL EXAM:  GEN: laying in bed, appears well, NAD  NEURO: AOx3. FC, OE spont, speech intact, face symmetric. CNII-XII intact. +mild R facial numbness in V1/V2/V3 distribution . PERRL, EOMI. +mild b/l horizontal nystagmus. No pronator drift. MAEx4. 5/5 strength throughout.   CV: RRR +S1/S2  PULM: CTAB  GI: Abd soft, NT/ND  EXT: ext warm, dry, nontende    DIET:  [] NPO  [x] Mechanical  [] Tube feeds        LABS:                        11.2   12.93 )-----------( 167      ( 2022 05:43 )             34.4         136  |  101  |  9   ----------------------------<  154<H>  4.2   |  25  |  0.60    Ca    8.0<L>      2022 19:54  Phos  3.2       Mg     1.8             Urinalysis Basic - ( 2022 02:46 )    Color: x / Appearance: x / S.020 / pH: x  Gluc: x / Ketone: x  / Bili: x / Urobili: x   Blood: x / Protein: x / Nitrite: x   Leuk Esterase: x / RBC: x / WBC x   Sq Epi: x / Non Sq Epi: x / Bacteria: x          CAPILLARY BLOOD GLUCOSE      POCT Blood Glucose.: 123 mg/dL (2022 21:49)  POCT Blood Glucose.: 121 mg/dL (2022 17:03)  POCT Blood Glucose.: 117 mg/dL (2022 10:58)  POCT Blood Glucose.: 133 mg/dL (2022 06:25)      Drug Levels: [] N/A    CSF Analysis: [] N/A      Allergies    No Known Allergies    Intolerances    dairy products (Stomach Upset)    MEDICATIONS:  Antibiotics:    Neuro:  acetaminophen     Tablet .. 650 milliGRAM(s) Oral every 6 hours PRN  gabapentin 600 milliGRAM(s) Oral at bedtime  metoclopramide Injectable 10 milliGRAM(s) IV Push every 6 hours  ondansetron Injectable 4 milliGRAM(s) IV Push every 6 hours  OXcarbazepine 150 milliGRAM(s) Oral two times a day  oxycodone    5 mG/acetaminophen 325 mG 1 Tablet(s) Oral every 6 hours PRN  oxycodone    5 mG/acetaminophen 325 mG 2 Tablet(s) Oral every 6 hours PRN  prochlorperazine   Injectable 10 milliGRAM(s) IV Push every 6 hours PRN  scopolamine 1 mG/72 Hr(s) Patch 1 Patch Transdermal every 72 hours    Anticoagulation:    OTHER:  atorvastatin 20 milliGRAM(s) Oral at bedtime  chlorhexidine 2% Cloths 1 Application(s) Topical <User Schedule>  dextrose 40% Gel 15 Gram(s) Oral once  dextrose 50% Injectable 25 Gram(s) IV Push once  dextrose 50% Injectable 12.5 Gram(s) IV Push once  dextrose 50% Injectable 25 Gram(s) IV Push once  glucagon  Injectable 1 milliGRAM(s) IntraMuscular once  hydrALAZINE Injectable 10 milliGRAM(s) IV Push every 2 hours PRN  influenza   Vaccine 0.5 milliLiter(s) IntraMuscular once  insulin lispro (ADMELOG) corrective regimen sliding scale   SubCutaneous Before meals and at bedtime  pantoprazole  Injectable 40 milliGRAM(s) IV Push daily  senna 2 Tablet(s) Oral at bedtime  sucralfate 1 Gram(s) Oral four times a day    IVF:  dextrose 5%. 1000 milliLiter(s) IV Continuous <Continuous>  dextrose 5%. 1000 milliLiter(s) IV Continuous <Continuous>  sodium chloride 2 Gram(s) Oral every 6 hours    CULTURES:    RADIOLOGY & ADDITIONAL TESTS:    G50.0    Handoff    HTN (hypertension)    High cholesterol  < from: CT Head No Cont (22 @ 14:01) >    IMPRESSION: Examination status post right suboccipital craniectomy with   mesh cranioplasty for microvascular decompression with postsurgical   changes.    < end of copied text >    Trigeminal neuralgia    Diverticulosis    Hiatal hernia    DM (diabetes mellitus)    Thyroid goiter    KORTNEY on CPAP    No pertinent past medical history    HTN (hypertension)    High cholesterol    Trigeminal neuralgia    Diverticulosis    Trigeminal neuralgia    Trigeminal neuralgia    Trigeminal neuralgia    Decompression, cranial nerve, microvascular    Microvascular decompression of trigeminal nerve    No significant past surgical history    History of cholecystectomy    H/O bilateral breast reduction surgery    Status post gamma knife treatment    No significant past surgical history    SysAdmin_VstLnk        ASSESSMENT:  61y Female with trigeminal neuralgia presenting with right sided facial pain and numbness in right V2/V3 distribution with failed medical management, s/p right microvascular decompression of trigeminal nerve with lumbar drain placement (22). LD dc'd       NEURO:  -neuro/vitals q4hr  -pain control prn    -decadron completed  -LD dc'   -cont home trileptal, gabapentin  -CTH  stable, no hemorrhage    CARDIOVASCULAR:  -SBP goal 100-150  -QTC : 416    PULMONARY:  -room air, satting well   -hx of KORTNEY, sat goal > 92% ok    RENAL:  - 3% dc'd, salt tabs 2gq6  - 800cc fluid restriction  -straight cath prn   -electrolytes prn     GI:  - regular diet, poor PO intake  -bowel regimen  -protonix while on decadron  - cont home sucralfate 1 qid   -nausea post op- zofran, reglan, compazine, scopolamine patch prns    HEME:  -trend h/h  -SCDs, SQL held for LD removal    ID:  - trend leukocytosis    ENDO:  -ISS    DISPOSITION:   -SDU status   -Full code  -PT/OT: pending home with no needs  -Discussed with Dr. Ferrer        Assessment:  Present when checked    []  GCS  E   V  M     Heart Failure: []Acute, [] acute on chronic , []chronic  Heart Failure:  [] Diastolic (HFpEF), [] Systolic (HFrEF), []Combined (HFpEF and HFrEF), [] RHF, [] Pulm HTN, [] Other    [] ERICA, [] ATN, [] AIN, [] other  [] CKD1, [] CKD2, [] CKD 3, [] CKD 4, [] CKD 5, []ESRD    Encephalopathy: [] Metabolic, [] Hepatic, [] toxic, [] Neurological, [] Other    Abnormal Nurtitional Status: [] malnurtition (see nutrition note), [ ]underweight: BMI < 19, [] morbid obesity: BMI >40, [] Cachexia    [] Sepsis  [] hypovolemic shock,[] cardiogenic shock, [] hemorrhagic shock, [] neuogenic shock  [] Acute Respiratory Failure  []Cerebral edema, [] Brain compression/ herniation,   [] Functional quadriplegia  [] Acute blood loss anemia

## 2022-01-23 NOTE — PROGRESS NOTE ADULT - TIME BILLING
not critically ill but medically complex  inpatient post op care as above
not critically ill but medically complex  inpatient post op care as above

## 2022-01-23 NOTE — PROGRESS NOTE ADULT - SUBJECTIVE AND OBJECTIVE BOX
HPI:  62 y/o Female with trigeminal neuralgia presenting with right sided facial pain and numbness in right V2/V3 distribution with failed medical management, planned for right microvascular decompression of trigeminal nerve with lumbar drain  placement. (19 Jan 2022 21:13)    OVERNIGHT EVENTS: LD clamped     Hospital Course:   1/19: POD0 s/p right microvascular decompression of trigeminal nerve for trigeminal neuralgia with lumbar drain placement   1/20: POD1.Nausea post op; on compazine, scopolamine patch, zofran. LD draining 10cc/hr  1/23:     REVIEW OF SYSTEMS: [] Unable to Assess due to neurologic exam   [ x] All ROS addressed below are non-contributory, except:  Neuro: [ ] Headache [ ] Back pain [ ] Numbness [ ] Weakness [ ] Ataxia [x ] Dizziness [ ] Aphasia [ ] Dysarthria [ ] Visual disturbance  Resp: [ ] Shortness of breath/dyspnea [ ] Orthopnea [ ] Cough  CV: [ ] Chest pain [ ] Palpitation [ ] Lightheadedness [ ] Syncope  Renal: [ ] Thirst [ ] Edema  GI: [ x] Nausea [ ] Emesis [ ] Abdominal pain [ ] Constipation [ ] Diarrhea  Hem: [ ] Hematemesis [ ] bBright red blood per rectum  ID: [ ] Fever [ ] Chills [ ] Dysuria  ENT: [ ] Rhinorrhea           ICU Vital Signs Last 24 Hrs  T(C): 37 (23 Jan 2022 05:18), Max: 37.2 (22 Jan 2022 18:08)  T(F): 98.6 (23 Jan 2022 05:18), Max: 98.9 (22 Jan 2022 18:08)  HR: 80 (23 Jan 2022 05:00) (80 - 98)  BP: 154/75 (23 Jan 2022 05:00) (142/74 - 166/82)  BP(mean): 105 (23 Jan 2022 05:00) (96 - 112)  RR: 21 (23 Jan 2022 05:00) (8 - 28)  SpO2: 96% (23 Jan 2022 05:00) (94% - 97%)      01-21-22 @ 07:01  -  01-22-22 @ 07:00  --------------------------------------------------------  IN: 1365 mL / OUT: 850 mL / NET: 515 mL    01-22-22 @ 07:01  -  01-23-22 @ 06:36  --------------------------------------------------------  IN: 1190 mL / OUT: 815 mL / NET: 375 mL        PHYSICAL EXAM:  GEN: Calm  NEURO: Alert, awake, oriented x3, following commands, opens eyes spontaneously, speech intact, face symmetric. CNII-XII intact. +R facial numbness in V2/V3 distribution. PERRL, EOMI.   +b/l horizontal nystagmus. No pronator drift. Moving all extremities with 5/5 strength throughout.   CV: RRR +S1/S2  PULM: CTAB  GI: Abd soft, NT/ND  EXT: Ext warm, dry, nontender           MEDICATIONS:   acetaminophen     Tablet .. 650 milliGRAM(s) Oral every 6 hours PRN  atorvastatin 20 milliGRAM(s) Oral at bedtime  chlorhexidine 2% Cloths 1 Application(s) Topical <User Schedule>  enoxaparin Injectable 40 milliGRAM(s) SubCutaneous every 12 hours  gabapentin 600 milliGRAM(s) Oral at bedtime  influenza   Vaccine 0.5 milliLiter(s) IntraMuscular once  metoclopramide Injectable 10 milliGRAM(s) IV Push every 6 hours  ondansetron Injectable 4 milliGRAM(s) IV Push every 6 hours  OXcarbazepine 150 milliGRAM(s) Oral two times a day  oxycodone    5 mG/acetaminophen 325 mG 1 Tablet(s) Oral every 6 hours PRN  oxycodone    5 mG/acetaminophen 325 mG 2 Tablet(s) Oral every 6 hours PRN  pantoprazole  Injectable 40 milliGRAM(s) IV Push daily  prochlorperazine   Injectable 10 milliGRAM(s) IV Push every 6 hours PRN  scopolamine 1 mG/72 Hr(s) Patch 1 Patch Transdermal every 72 hours  senna 2 Tablet(s) Oral at bedtime  sodium chloride 3 Gram(s) Oral every 6 hours  sucralfate 1 Gram(s) Oral four times a day                          11.2   12.93 )-----------( 167      ( 22 Jan 2022 05:43 )             34.4     01-23    134<L>  |  100  |  9   ----------------------------<  144<H>  3.8   |  26  |  0.60    Ca    8.3<L>      23 Jan 2022 01:20  Phos  3.2     01-22  Mg     1.8     01-22                   HPI:  62 y/o Female with trigeminal neuralgia presenting with right sided facial pain and numbness in right V2/V3 distribution with failed medical management, planned for right microvascular decompression of trigeminal nerve with lumbar drain  placement. (19 Jan 2022 21:13)    OVERNIGHT EVENTS: LD clamped     Hospital Course:   1/19: POD0 s/p right microvascular decompression of trigeminal nerve for trigeminal neuralgia with lumbar drain placement   1/20: POD1.Nausea post op; on compazine, scopolamine patch, zofran. LD draining 10cc/hr  1/23:     REVIEW OF SYSTEMS: [] Unable to Assess due to neurologic exam   [ x] All ROS addressed below are non-contributory, except:  Neuro: [ ] Headache [ ] Back pain [ ] Numbness [ ] Weakness [ ] Ataxia [x ] Dizziness [ ] Aphasia [ ] Dysarthria [ ] Visual disturbance  Resp: [ ] Shortness of breath/dyspnea [ ] Orthopnea [ ] Cough  CV: [ ] Chest pain [ ] Palpitation [ ] Lightheadedness [ ] Syncope  Renal: [ ] Thirst [ ] Edema  GI: [ x] Nausea [ ] Emesis [ ] Abdominal pain [ ] Constipation [ ] Diarrhea  Hem: [ ] Hematemesis [ ] bBright red blood per rectum  ID: [ ] Fever [ ] Chills [ ] Dysuria  ENT: [ ] Rhinorrhea           ICU Vital Signs Last 24 Hrs  T(C): 37 (23 Jan 2022 05:18), Max: 37.2 (22 Jan 2022 18:08)  T(F): 98.6 (23 Jan 2022 05:18), Max: 98.9 (22 Jan 2022 18:08)  HR: 80 (23 Jan 2022 05:00) (80 - 98)  BP: 154/75 (23 Jan 2022 05:00) (142/74 - 166/82)  BP(mean): 105 (23 Jan 2022 05:00) (96 - 112)  RR: 21 (23 Jan 2022 05:00) (8 - 28)  SpO2: 96% (23 Jan 2022 05:00) (94% - 97%)      01-21-22 @ 07:01  -  01-22-22 @ 07:00  --------------------------------------------------------  IN: 1365 mL / OUT: 850 mL / NET: 515 mL    01-22-22 @ 07:01  -  01-23-22 @ 06:36  --------------------------------------------------------  IN: 1190 mL / OUT: 815 mL / NET: 375 mL        PHYSICAL EXAM:  GEN: Calm  NEURO: Alert, awake, oriented x3, following commands, opens eyes spontaneously, speech intact, face symmetric. CNII-XII intact. +R facial numbness in V2/V3 distribution (improving) . PERRL, EOMI.   +b/l horizontal nystagmus. No pronator drift. Moving all extremities with 5/5 strength throughout.   CV: RRR +S1/S2  PULM: CTAB  GI: Abd soft, NT/ND  EXT: Ext warm, dry, nontender         MEDICATIONS:   acetaminophen     Tablet .. 650 milliGRAM(s) Oral every 6 hours PRN  atorvastatin 20 milliGRAM(s) Oral at bedtime  chlorhexidine 2% Cloths 1 Application(s) Topical <User Schedule>  enoxaparin Injectable 40 milliGRAM(s) SubCutaneous every 12 hours  gabapentin 600 milliGRAM(s) Oral at bedtime  influenza   Vaccine 0.5 milliLiter(s) IntraMuscular once  metoclopramide Injectable 10 milliGRAM(s) IV Push every 6 hours  ondansetron Injectable 4 milliGRAM(s) IV Push every 6 hours  OXcarbazepine 150 milliGRAM(s) Oral two times a day  oxycodone    5 mG/acetaminophen 325 mG 1 Tablet(s) Oral every 6 hours PRN  oxycodone    5 mG/acetaminophen 325 mG 2 Tablet(s) Oral every 6 hours PRN  pantoprazole  Injectable 40 milliGRAM(s) IV Push daily  prochlorperazine   Injectable 10 milliGRAM(s) IV Push every 6 hours PRN  scopolamine 1 mG/72 Hr(s) Patch 1 Patch Transdermal every 72 hours  senna 2 Tablet(s) Oral at bedtime  sodium chloride 3 Gram(s) Oral every 6 hours  sucralfate 1 Gram(s) Oral four times a day                          11.2   12.93 )-----------( 167      ( 22 Jan 2022 05:43 )             34.4     01-23    134<L>  |  100  |  9   ----------------------------<  144<H>  3.8   |  26  |  0.60    Ca    8.3<L>      23 Jan 2022 01:20  Phos  3.2     01-22  Mg     1.8     01-22                   HPI:  62 y/o Female with trigeminal neuralgia presenting with right sided facial pain and numbness in right V2/V3 distribution with failed medical management, planned for right microvascular decompression of trigeminal nerve with lumbar drain  placement. (19 Jan 2022 21:13)    OVERNIGHT EVENTS: Afebrile. Na 134. Increased salt tabs.      Hospital Course:   1/19: POD0 s/p right microvascular decompression of trigeminal nerve for trigeminal neuralgia with lumbar drain placement   1/20: POD1.Nausea post op; on compazine, scopolamine patch, zofran. LD draining 10cc/hr  1/22: LD removed    REVIEW OF SYSTEMS: [] Unable to Assess due to neurologic exam   [ x] All ROS addressed below are non-contributory, except:  Neuro: [ ] Headache [ ] Back pain [ ] Numbness [ ] Weakness [ ] Ataxia [x ] Dizziness [ ] Aphasia [ ] Dysarthria [ ] Visual disturbance  Resp: [ ] Shortness of breath/dyspnea [ ] Orthopnea [ ] Cough  CV: [ ] Chest pain [ ] Palpitation [ ] Lightheadedness [ ] Syncope  Renal: [ ] Thirst [ ] Edema  GI: [ x] Nausea [ ] Emesis [ ] Abdominal pain [ ] Constipation [ ] Diarrhea  Hem: [ ] Hematemesis [ ] bBright red blood per rectum  ID: [ ] Fever [ ] Chills [ ] Dysuria  ENT: [ ] Rhinorrhea       PAST MEDICAL & SURGICAL HISTORY:  High cholesterol  Trigeminal neuralgia  Diverticulosis  Hiatal hernia  DM (diabetes mellitus)-diet controlled  Thyroid goiter  KORTNEY on CPAP  History of cholecystectomy  H/O bilateral breast reduction surgery  Status post gamma knife treatment  for trigeminal neuralgia, 2017        ICU Vital Signs Last 24 Hrs  T(C): 37 (23 Jan 2022 05:18), Max: 37.2 (22 Jan 2022 18:08)  T(F): 98.6 (23 Jan 2022 05:18), Max: 98.9 (22 Jan 2022 18:08)  HR: 80 (23 Jan 2022 05:00) (80 - 98)  BP: 154/75 (23 Jan 2022 05:00) (142/74 - 166/82)  BP(mean): 105 (23 Jan 2022 05:00) (96 - 112)  RR: 21 (23 Jan 2022 05:00) (8 - 28)  SpO2: 96% (23 Jan 2022 05:00) (94% - 97%)      01-21-22 @ 07:01  -  01-22-22 @ 07:00  --------------------------------------------------------  IN: 1365 mL / OUT: 850 mL / NET: 515 mL    01-22-22 @ 07:01  -  01-23-22 @ 06:36  --------------------------------------------------------  IN: 1190 mL / OUT: 815 mL / NET: 375 mL        PHYSICAL EXAM:  GEN: Calm  NEURO: Alert, awake, oriented x3, following commands, opens eyes spontaneously, speech intact, face symmetric. CNII-XII intact. +R facial numbness in V2/V3 distribution (improving) . PERRL, EOMI.   +b/l horizontal nystagmus. No pronator drift. Moving all extremities with 5/5 strength throughout.   CV: RRR +S1/S2  PULM: CTAB  GI: Abd soft, NT/ND  EXT: Ext warm, dry, nontender         MEDICATIONS:   acetaminophen     Tablet .. 650 milliGRAM(s) Oral every 6 hours PRN  atorvastatin 20 milliGRAM(s) Oral at bedtime  chlorhexidine 2% Cloths 1 Application(s) Topical <User Schedule>  enoxaparin Injectable 40 milliGRAM(s) SubCutaneous every 12 hours  gabapentin 600 milliGRAM(s) Oral at bedtime  influenza   Vaccine 0.5 milliLiter(s) IntraMuscular once  metoclopramide Injectable 10 milliGRAM(s) IV Push every 6 hours  ondansetron Injectable 4 milliGRAM(s) IV Push every 6 hours  OXcarbazepine 150 milliGRAM(s) Oral two times a day  oxycodone    5 mG/acetaminophen 325 mG 1 Tablet(s) Oral every 6 hours PRN  oxycodone    5 mG/acetaminophen 325 mG 2 Tablet(s) Oral every 6 hours PRN  pantoprazole  Injectable 40 milliGRAM(s) IV Push daily  prochlorperazine   Injectable 10 milliGRAM(s) IV Push every 6 hours PRN  scopolamine 1 mG/72 Hr(s) Patch 1 Patch Transdermal every 72 hours  senna 2 Tablet(s) Oral at bedtime  sodium chloride 3 Gram(s) Oral every 6 hours  sucralfate 1 Gram(s) Oral four times a day                          11.2   12.93 )-----------( 167      ( 22 Jan 2022 05:43 )             34.4     01-23    134<L>  |  100  |  9   ----------------------------<  144<H>  3.8   |  26  |  0.60    Ca    8.3<L>      23 Jan 2022 01:20  Phos  3.2     01-22  Mg     1.8     01-22

## 2022-01-23 NOTE — PROGRESS NOTE ADULT - ASSESSMENT
ASSESSMENT:  60 y/o F with trigeminal neuralgia. Status post day #3 right microvascular decompression of trigeminal nerve with lumbar drain placement     PLAN:  NEURO:  CTH this am  Neuro checks Q4hr  Analgesia prn  LD Dced  Continue trileptal and gabapentin per home regimen  Activity: PT/OT    CARDIOVASCULAR:  SBP goal 100-<150    PULMONARY:  Room air- maintain sat  >92%  incentive spirometry as able    RENAL: Hyponatremia started on 3% overnight, continue for goal Na 135-145  Likely SIADH, fluid restriction   Trial of void  keep K>4, Mg>2      GI:  Advance diet as tolerated  On bowel regimen- senna   Protonix and sucralfate per home meds  Nausea post op- zofran, reglan- standing for 24 hours.  PRN compazine. Scopolamine patch. Monitor QTc    HEME: Thrombocytopenia  Monitor PLTs - was taking PPI at home  ( not likely source of low PLTS )  Lovenox  40mg q day- start in pm 1/22/22  SCDs    ID:  Monitor for fevers    ENDO:  ISS  A1c 5.7    Disposition:  ASSESSMENT:  62 y/o F with trigeminal neuralgia. Status post day #4 right microvascular decompression of trigeminal nerve with lumbar drain placement     PLAN:  NEURO:  CTH post op stable  Neurochecks Q4hr  Analgesia prn  LD Dced  Continue trileptal and gabapentin per home regimen  Activity: PT/OT    CARDIOVASCULAR:  SBP goal 100-<160  EKG re QTc; wnl    PULMONARY:  Room air- maintain sat  >92%  incentive spirometry as able    RENAL: Hyponatremia   Na 135-145. Repeat BMP 1/23.   Likely SIADH, fluid restriction 800cc. On salt tabs.  keep K>4, Mg>2    GI:  Advance diet as tolerated  On bowel regimen- senna.  Protonix and sucralfate per home meds  Nausea improving. Zofran RTC.   LBM: 1/20. Augment bowel regimen.   PRN compazine, scopolamine patch. Monitor QTc while on above agents    HEME: Thrombocytopenia  Monitor Plts - was taking PPI at home  ( not likely source of low PLTS )  Lovenox 40mg bid. Anti Xa level  1/24 1pm. SCDs    ID:  Monitor for fevers      ENDO:  ISS  A1c 5.7    Disposition: Stepdown      ICU stepdown Checklist:    Completed: 01-23 @ 09:21    [X] hemodynamically stable – VS WNL and stable x 24hours, UO adequate  [n/a ] if  previously on HDA - off pressors x 24h with stable neuro exam    [X] no new symptoms x 24h (i.e. new fever, new-onset nausea/vomiting)  [X] stable labs: (i.e. WBC not rising, sodium not dropping)  [X] patient not at high risk for aspiration, if high risk then:                  [ ] should have definitive plans for trach/PEG (alternative option is to discharge from ICU to facilty)                  [ ] stepdown to bed close to nurse’s station  [n/a] low suctioning requirements (i.e. q4h or less)  [X] sign-off from primary RN*  [X] drains do not require ICU level of care  [X] if patient previously agitated or with behavioral issues – controlled   [X] pain controlled   ASSESSMENT:  62 y/o F with trigeminal neuralgia.  POD 4 S/P  right microvascular decompression of trigeminal nerve with lumbar drain placement     PLAN:  NEURO:  CTH post op stable  Neurochecks Q4hr  Analgesia prn  LD Dced  Continue trileptal and gabapentin per home regimen  Activity: PT/OT    CARDIOVASCULAR:  SBP goal 100-<160  EKG re QTc; wnl    PULMONARY:  Room air- maintain sat  >92%  incentive spirometry as able    RENAL: Hyponatremia. Likely SIADH.  Na 135-145. Repeat BMP 1/23.   Fluid restriction 800cc. On salt tabs.  Keep K>4, Mg>2    GI:  Diet as tolerated  On bowel regimen- senna. LBM: 1/20. Augment bowel regimen 1/23.  Protonix and sucralfate per home meds  Nausea improving. Zofran RTC.   PRN compazine, reglan, scopolamine patch. Monitor QTc while on above agents    HEME: Thrombocytopenia- improving.   Monitor Plts - was taking PPI at home  (not likely source of low Plts ) 140-> 160  Lovenox 40mg bid. Anti Xa level  1/24 1pm. SCDs    ID:  Monitor for fevers    ENDO:  ISS  A1c 5.7    Patient is not longer critically ill    Disposition: Stepdown      ICU stepdown Checklist:    Completed: 01-23 @ 09:21    [X] hemodynamically stable – VS WNL and stable x 24hours, UO adequate  [n/a ] if  previously on HDA - off pressors x 24h with stable neuro exam    [X] no new symptoms x 24h (i.e. new fever, new-onset nausea/vomiting)  [X] stable labs: (i.e. WBC not rising, sodium not dropping)  [X] patient not at high risk for aspiration, if high risk then:                  [ ] should have definitive plans for trach/PEG (alternative option is to discharge from ICU to facilty)                  [ ] stepdown to bed close to nurse’s station  [n/a] low suctioning requirements (i.e. q4h or less)  [X] sign-off from primary RN*  [X] drains do not require ICU level of care  [X] if patient previously agitated or with behavioral issues – controlled   [X] pain controlled

## 2022-01-24 ENCOUNTER — TRANSCRIPTION ENCOUNTER (OUTPATIENT)
Age: 62
End: 2022-01-24

## 2022-01-24 DIAGNOSIS — E78.00 PURE HYPERCHOLESTEROLEMIA, UNSPECIFIED: ICD-10-CM

## 2022-01-24 DIAGNOSIS — K44.9 DIAPHRAGMATIC HERNIA WITHOUT OBSTRUCTION OR GANGRENE: ICD-10-CM

## 2022-01-24 DIAGNOSIS — G47.33 OBSTRUCTIVE SLEEP APNEA (ADULT) (PEDIATRIC): ICD-10-CM

## 2022-01-24 DIAGNOSIS — E11.9 TYPE 2 DIABETES MELLITUS WITHOUT COMPLICATIONS: ICD-10-CM

## 2022-01-24 LAB
ANION GAP SERPL CALC-SCNC: 10 MMOL/L — SIGNIFICANT CHANGE UP (ref 5–17)
BUN SERPL-MCNC: 6 MG/DL — LOW (ref 7–23)
CALCIUM SERPL-MCNC: 8.4 MG/DL — SIGNIFICANT CHANGE UP (ref 8.4–10.5)
CHLORIDE SERPL-SCNC: 98 MMOL/L — SIGNIFICANT CHANGE UP (ref 96–108)
CO2 SERPL-SCNC: 29 MMOL/L — SIGNIFICANT CHANGE UP (ref 22–31)
CREAT SERPL-MCNC: 0.57 MG/DL — SIGNIFICANT CHANGE UP (ref 0.5–1.3)
GLUCOSE SERPL-MCNC: 120 MG/DL — HIGH (ref 70–99)
HCT VFR BLD CALC: 33.5 % — LOW (ref 34.5–45)
HGB BLD-MCNC: 11.3 G/DL — LOW (ref 11.5–15.5)
LMWH PPP CHRO-ACNC: 0.29 IU/ML — LOW (ref 0.5–1.1)
MAGNESIUM SERPL-MCNC: 1.8 MG/DL — SIGNIFICANT CHANGE UP (ref 1.6–2.6)
MCHC RBC-ENTMCNC: 31.2 PG — SIGNIFICANT CHANGE UP (ref 27–34)
MCHC RBC-ENTMCNC: 33.7 GM/DL — SIGNIFICANT CHANGE UP (ref 32–36)
MCV RBC AUTO: 92.5 FL — SIGNIFICANT CHANGE UP (ref 80–100)
NRBC # BLD: 0 /100 WBCS — SIGNIFICANT CHANGE UP (ref 0–0)
PHOSPHATE SERPL-MCNC: 4 MG/DL — SIGNIFICANT CHANGE UP (ref 2.5–4.5)
PLATELET # BLD AUTO: 160 K/UL — SIGNIFICANT CHANGE UP (ref 150–400)
POTASSIUM SERPL-MCNC: 3.7 MMOL/L — SIGNIFICANT CHANGE UP (ref 3.5–5.3)
POTASSIUM SERPL-SCNC: 3.7 MMOL/L — SIGNIFICANT CHANGE UP (ref 3.5–5.3)
RBC # BLD: 3.62 M/UL — LOW (ref 3.8–5.2)
RBC # FLD: 13 % — SIGNIFICANT CHANGE UP (ref 10.3–14.5)
SODIUM SERPL-SCNC: 137 MMOL/L — SIGNIFICANT CHANGE UP (ref 135–145)
WBC # BLD: 6.22 K/UL — SIGNIFICANT CHANGE UP (ref 3.8–10.5)
WBC # FLD AUTO: 6.22 K/UL — SIGNIFICANT CHANGE UP (ref 3.8–10.5)

## 2022-01-24 PROCEDURE — 99233 SBSQ HOSP IP/OBS HIGH 50: CPT

## 2022-01-24 PROCEDURE — 99024 POSTOP FOLLOW-UP VISIT: CPT

## 2022-01-24 RX ORDER — POTASSIUM CHLORIDE 20 MEQ
40 PACKET (EA) ORAL ONCE
Refills: 0 | Status: COMPLETED | OUTPATIENT
Start: 2022-01-24 | End: 2022-01-24

## 2022-01-24 RX ORDER — ACETAMINOPHEN 500 MG
1000 TABLET ORAL ONCE
Refills: 0 | Status: COMPLETED | OUTPATIENT
Start: 2022-01-24 | End: 2022-01-24

## 2022-01-24 RX ORDER — TRAMADOL HYDROCHLORIDE 50 MG/1
25 TABLET ORAL ONCE
Refills: 0 | Status: DISCONTINUED | OUTPATIENT
Start: 2022-01-24 | End: 2022-01-24

## 2022-01-24 RX ORDER — LACTULOSE 10 G/15ML
10 SOLUTION ORAL ONCE
Refills: 0 | Status: COMPLETED | OUTPATIENT
Start: 2022-01-25 | End: 2022-01-25

## 2022-01-24 RX ORDER — SODIUM CHLORIDE 9 MG/ML
3 INJECTION INTRAMUSCULAR; INTRAVENOUS; SUBCUTANEOUS EVERY 8 HOURS
Refills: 0 | Status: DISCONTINUED | OUTPATIENT
Start: 2022-01-24 | End: 2022-01-25

## 2022-01-24 RX ORDER — OXYCODONE AND ACETAMINOPHEN 5; 325 MG/1; MG/1
1 TABLET ORAL EVERY 6 HOURS
Refills: 0 | Status: DISCONTINUED | OUTPATIENT
Start: 2022-01-24 | End: 2022-01-25

## 2022-01-24 RX ORDER — TRAMADOL HYDROCHLORIDE 50 MG/1
50 TABLET ORAL EVERY 4 HOURS
Refills: 0 | Status: DISCONTINUED | OUTPATIENT
Start: 2022-01-24 | End: 2022-01-25

## 2022-01-24 RX ORDER — MAGNESIUM SULFATE 500 MG/ML
2 VIAL (ML) INJECTION ONCE
Refills: 0 | Status: COMPLETED | OUTPATIENT
Start: 2022-01-24 | End: 2022-01-24

## 2022-01-24 RX ADMIN — Medication 40 MILLIEQUIVALENT(S): at 13:11

## 2022-01-24 RX ADMIN — Medication 1 GRAM(S): at 18:03

## 2022-01-24 RX ADMIN — ATORVASTATIN CALCIUM 20 MILLIGRAM(S): 80 TABLET, FILM COATED ORAL at 22:56

## 2022-01-24 RX ADMIN — ONDANSETRON 4 MILLIGRAM(S): 8 TABLET, FILM COATED ORAL at 18:04

## 2022-01-24 RX ADMIN — Medication 1000 MILLIGRAM(S): at 23:23

## 2022-01-24 RX ADMIN — POLYETHYLENE GLYCOL 3350 17 GRAM(S): 17 POWDER, FOR SOLUTION ORAL at 12:19

## 2022-01-24 RX ADMIN — CHLORHEXIDINE GLUCONATE 1 APPLICATION(S): 213 SOLUTION TOPICAL at 06:53

## 2022-01-24 RX ADMIN — ONDANSETRON 4 MILLIGRAM(S): 8 TABLET, FILM COATED ORAL at 12:18

## 2022-01-24 RX ADMIN — OXCARBAZEPINE 150 MILLIGRAM(S): 300 TABLET, FILM COATED ORAL at 06:51

## 2022-01-24 RX ADMIN — Medication 1 GRAM(S): at 12:17

## 2022-01-24 RX ADMIN — GABAPENTIN 600 MILLIGRAM(S): 400 CAPSULE ORAL at 22:57

## 2022-01-24 RX ADMIN — OXCARBAZEPINE 150 MILLIGRAM(S): 300 TABLET, FILM COATED ORAL at 18:03

## 2022-01-24 RX ADMIN — SODIUM CHLORIDE 3 GRAM(S): 9 INJECTION INTRAMUSCULAR; INTRAVENOUS; SUBCUTANEOUS at 06:51

## 2022-01-24 RX ADMIN — TRAMADOL HYDROCHLORIDE 25 MILLIGRAM(S): 50 TABLET ORAL at 06:51

## 2022-01-24 RX ADMIN — OXYCODONE AND ACETAMINOPHEN 1 TABLET(S): 5; 325 TABLET ORAL at 14:28

## 2022-01-24 RX ADMIN — ONDANSETRON 4 MILLIGRAM(S): 8 TABLET, FILM COATED ORAL at 06:52

## 2022-01-24 RX ADMIN — SCOPALAMINE 1 PATCH: 1 PATCH, EXTENDED RELEASE TRANSDERMAL at 18:09

## 2022-01-24 RX ADMIN — Medication 400 MILLIGRAM(S): at 23:02

## 2022-01-24 RX ADMIN — ENOXAPARIN SODIUM 40 MILLIGRAM(S): 100 INJECTION SUBCUTANEOUS at 12:18

## 2022-01-24 RX ADMIN — Medication 1 GRAM(S): at 06:52

## 2022-01-24 RX ADMIN — Medication 650 MILLIGRAM(S): at 00:41

## 2022-01-24 RX ADMIN — SENNA PLUS 2 TABLET(S): 8.6 TABLET ORAL at 22:57

## 2022-01-24 RX ADMIN — PANTOPRAZOLE SODIUM 40 MILLIGRAM(S): 20 TABLET, DELAYED RELEASE ORAL at 06:52

## 2022-01-24 RX ADMIN — SCOPALAMINE 1 PATCH: 1 PATCH, EXTENDED RELEASE TRANSDERMAL at 06:38

## 2022-01-24 RX ADMIN — OXYCODONE AND ACETAMINOPHEN 1 TABLET(S): 5; 325 TABLET ORAL at 15:28

## 2022-01-24 RX ADMIN — TRAMADOL HYDROCHLORIDE 25 MILLIGRAM(S): 50 TABLET ORAL at 07:44

## 2022-01-24 RX ADMIN — SODIUM CHLORIDE 3 GRAM(S): 9 INJECTION INTRAMUSCULAR; INTRAVENOUS; SUBCUTANEOUS at 22:57

## 2022-01-24 RX ADMIN — Medication 25 GRAM(S): at 13:11

## 2022-01-24 RX ADMIN — SODIUM CHLORIDE 3 GRAM(S): 9 INJECTION INTRAMUSCULAR; INTRAVENOUS; SUBCUTANEOUS at 15:29

## 2022-01-24 RX ADMIN — Medication 650 MILLIGRAM(S): at 19:20

## 2022-01-24 NOTE — DISCHARGE NOTE PROVIDER - CARE PROVIDER_API CALL
David Ferrer; MPH; MS)  Neurosurgery Surgery  [address], [address]  [place] [address]  Phone: [phone]  Fax: [phone]  Follow Up Time:

## 2022-01-24 NOTE — DISCHARGE NOTE PROVIDER - NSDCFUADDINST_GEN_ALL_CORE_FT
Neurosurgery follow up appointment date/time:  - your sutures/staples will be removed at your follow up appointment  - please call the office to confirm appointment    Wound Care:  - you may shower and shampoo your hair    Activity:  - fatigue is common after surgery, rest if you feel tired   - no bending, lifting, twisting or heavy lifting   - walking is recommended, ambulate as tolerated  - you may shower when you get home, keep your incision dry  - no bathing   - no driving within 24 hours of anesthesia or while taking prescription pain medications   - keep hydrated, drink plenty of water   - skullbase precautions: no nose blowing, sneeze with mouth open, no drinking out of a straw, no straining      Please also follow up with your primary care doctor.     Pain Expectations:  - pain after surgery is expected  - please take pain meds as prescribed     Medications:  - continue salt tablets, follow up with your primary care doctor to check your sodium level and taper dose as appropriate  - continue gabapentin and oxcarbazepine as prescribed  - pain medications can cause constipation, you should eat a high fiber diet and may take a stool softener while on pain meds   - Avoid taking Advil (ibuprofen), Motrin (naproxen), or Aspirin for pain as they can cause bleeding     Call the office or come to ED if:  - wound has drainage or bleeding, increased redness or pain at incision site, neurological change, fever (>101), chills, night sweats, syncope, nausea/vomiting      Playback:  - a copy of your discharge instructions will be available on Playback     WITHIN 24 HOURS OF DISCHARGE, PLEASE CONTACT NEURO PA  WITH ANY QUESTIONS OR CONCERNS: [phone]   OTHERWISE, PLEASE CALL THE OFFICE WITH ANY QUESTIONS OR CONCERNS Neurosurgery follow up appointment date/time:  - your sutures/staples will be removed at your follow up appointment  - please call the office to confirm appointment: [phone]    Wound Care:  - you may shower and shampoo your hair  - pat dry incision after showering  - keep incision uncovered, open to air     Activity:  - fatigue is common after surgery, rest if you feel tired   - no bending, lifting, twisting or heavy lifting   - walking is recommended, ambulate as tolerated  - you may shower when you get home, keep your incision dry  - no bathing   - no driving within 24 hours of anesthesia or while taking prescription pain medications   - keep hydrated, drink plenty of water      Please also follow up with your primary care doctor.     Pain Expectations:  - pain after surgery is expected  - please take pain meds as prescribed     Medications:  - continue salt tablets, follow up with your primary care doctor to check your sodium level and taper dose as appropriate  - continue gabapentin and oxcarbazepine as prescribed  - pain medications can cause constipation, you should eat a high fiber diet and may take a stool softener while on pain meds   - Avoid taking Advil (ibuprofen), Motrin (naproxen), or Aspirin for pain as they can cause bleeding     Call the office or come to ED if:  - wound has drainage or bleeding, increased redness or pain at incision site, neurological change, fever (>101), chills, night sweats, syncope, nausea/vomiting      Playback:  - a copy of your discharge instructions will be available on Playback     WITHIN 24 HOURS OF DISCHARGE, PLEASE CONTACT NEURO PA  WITH ANY QUESTIONS OR CONCERNS: [phone]   OTHERWISE, PLEASE CALL THE OFFICE WITH ANY QUESTIONS OR CONCERNS: [phone] Neurosurgery follow up:  - your sutures/staples will be removed at your follow up appointment  - your lumbar drain removal site has an absorbable suture in place (will dissolve on its own, does not need to be removed)   - please call the office to make/confirm appointment     Wound Care:  - you may shower and shampoo your hair  - pat dry incision after showering  - keep incision uncovered, open to air     Activity:  - fatigue is common after surgery, rest if you feel tired   - no bending, lifting, twisting or heavy lifting   - walking is recommended, ambulate as tolerated  - you may shower when you get home, keep your incision dry  - no bathing   - no driving within 24 hours of anesthesia or while taking prescription pain medications   - keep hydrated, drink plenty of water      Please also follow up with your primary care doctor within 1 week for a sodium check. Your salt tabs can be adjusted/weaned off for a normal sodium goal     Pain Expectations:  - pain after surgery is expected  - please take pain meds as prescribed     Medications:  - new meds: continue salt tablets, follow up with your primary care doctor to check your sodium level and taper dose as appropriate  - continue home gabapentin, omeprazole, oxcarbazepine, rosuvastatin, sucralfate as prescribed  - pain meds: Tylenol as needed, Tramadol as needed for severe pain  - anti nausea meds: Zofran as needed   - pain medications can cause constipation, you should eat a high fiber diet and may take a stool softener while on pain meds   - Avoid taking Advil (ibuprofen), Motrin (naproxen), or Aspirin for pain as they can cause bleeding     Call the office or come to ED if:  - wound has drainage or bleeding, increased redness or pain at incision site, neurological change, fever (>101), chills, night sweats, syncope, nausea/vomiting    WITHIN 24 HOURS OF DISCHARGE, PLEASE CONTACT NEURO PA  WITH ANY QUESTIONS OR CONCERNS: [phone]   OTHERWISE, PLEASE CALL THE OFFICE WITH ANY QUESTIONS OR CONCERNS: [phone] Neurosurgery follow up:  - your sutures/staples will be removed at your follow up appointment  - your lumbar drain removal site has an absorbable suture in place (will dissolve on its own, does not need to be removed)   - please call the office to make/confirm appointment     Wound Care:  - you may shower and shampoo your hair  - pat dry incision after showering  - keep incision uncovered, open to air     Activity:  - fatigue is common after surgery, rest if you feel tired   - no bending, lifting, twisting or heavy lifting   - walking is recommended, ambulate as tolerated  - you may shower when you get home, keep your incision dry  - no bathing   - no driving within 24 hours of anesthesia or while taking prescription pain medications   - keep hydrated, drink plenty of water      Please also follow up with your primary care doctor on 1/31/22 at 2PM for a sodium check. Your salt tabs can be adjusted/weaned off for a normal sodium goal     Pain Expectations:  - pain after surgery is expected  - please take pain meds as prescribed     Medications:  - new meds: continue salt tablets, follow up with your primary care doctor to check your sodium level and taper dose as appropriate  - continue home gabapentin, omeprazole, oxcarbazepine, rosuvastatin, sucralfate as prescribed  - pain meds: Tylenol as needed, Tramadol as needed for severe pain  - anti nausea meds: Zofran as needed   - pain medications can cause constipation, you should eat a high fiber diet and may take a stool softener while on pain meds   - Avoid taking Advil (ibuprofen), Motrin (naproxen), or Aspirin for pain as they can cause bleeding     Call the office or come to ED if:  - wound has drainage or bleeding, increased redness or pain at incision site, neurological change, fever (>101), chills, night sweats, syncope, nausea/vomiting    WITHIN 24 HOURS OF DISCHARGE, PLEASE CONTACT NEURO PA  WITH ANY QUESTIONS OR CONCERNS: [phone]   OTHERWISE, PLEASE CALL THE OFFICE WITH ANY QUESTIONS OR CONCERNS: [phone] Neurosurgery follow up:  - your sutures/staples will be removed at your follow up appointment  - your lumbar drain removal site has an absorbable suture in place (will dissolve on its own, does not need to be removed)   - please call the office to make/confirm appointment     Wound Care:  - you may shower and shampoo your hair  - pat dry incision after showering  - keep incision uncovered, open to air     Activity:  - fatigue is common after surgery, rest if you feel tired   - no bending, lifting, twisting or heavy lifting   - walking is recommended, ambulate as tolerated  - you may shower when you get home, keep your incision dry  - no bathing   - no driving within 24 hours of anesthesia or while taking prescription pain medications   - keep hydrated, drink plenty of water      Please also follow up with your primary care doctor on 1/31/22 at 2PM for a sodium check. You were started on sodium chloride tablets for hyponatremia (low sodium) during your hospitalization Your salt tabs can be adjusted/weaned off for a normal sodium goal. Please also continue a 1L fluid restriction at home.     Pain Expectations:  - pain after surgery is expected  - please take pain meds as prescribed     Medications:  - new meds: continue salt tablets (this is for low sodium level, follow up with your primary care doctor to check your sodium level and taper dose as appropriate)  - continue home gabapentin, omeprazole, oxcarbazepine, rosuvastatin, sucralfate as prescribed  - pain meds: Tylenol as needed, Tramadol as needed for severe pain  - anti nausea meds: Zofran as needed   - pain medications can cause constipation, you should eat a high fiber diet and may take a stool softener while on pain meds   - Avoid taking Advil (ibuprofen), Motrin (naproxen), or Aspirin for pain as they can cause bleeding     Call the office or come to ED if:  - wound has drainage or bleeding, increased redness or pain at incision site, neurological change, fever (>101), chills, night sweats, syncope, nausea/vomiting    WITHIN 24 HOURS OF DISCHARGE, PLEASE CONTACT NEURO PA  WITH ANY QUESTIONS OR CONCERNS: [phone]   OTHERWISE, PLEASE CALL THE OFFICE WITH ANY QUESTIONS OR CONCERNS: [phone]

## 2022-01-24 NOTE — PROGRESS NOTE ADULT - PROBLEM SELECTOR PLAN 1
management as per primary team  sxs likely 2/2 swelling    Post-Op: Please start fiber supplement, no BM since admission as per patient

## 2022-01-24 NOTE — PROGRESS NOTE ADULT - ASSESSMENT
61y Female with trigeminal neuralgia presenting with right sided facial pain and numbness in right V2/V3 distribution with failed medical management, s/p right microvascular decompression of trigeminal nerve with lumbar drain placement (1/19/22). JO-ANN dc'd 1/22

## 2022-01-24 NOTE — DISCHARGE NOTE PROVIDER - HOSPITAL COURSE
HPI:  61y Female with trigeminal neuralgia presenting with right sided facial pain  and numbness in right V2/V3 distribution with failed medical management, planned for   right microvascular decompression of trigeminal nerve with lumbar drain  placement    Hospital Course:  1/19: POD0 s/p right microvascular decompression of trigeminal nerve for trigeminal neuralgia with lumbar drain placement   1/20: POD1. TONIA o/n neuro stable. nausea post op, on compazine, scopolamine patch, zofran. LD draining 10cc/hr increased to 15/hr but experienced worsening headache and back pain, decreased back to 10cc/hr.   1/21: POD2 dilaudid IV given o/n for right low back pain, neuro stable, 200 cc bolus 3% given for hyponatremia, Dilaudid 0.25 given for pain.   1/22: POD3, overnight 3% at 30 and salt tabs started for hyponatremia. 1L fluid restriction in place. neuro stable, episode of twitching of R arm and R face in AM, CTH stable, patient pending stepdown, fluid restriction decreased to 800cc daily, US guided IV placed  1/23: POD4, 3% dc'd overnight, neuro stable, second US guided IV placed. Salt tabs increased to 3gq6, Na stable. Stepdown.   1/24: POD 5. TONIA overnight. exam stable. remains on salt tabs and fluid restriction for hyponatremia. pending home no needs    Patient evaluated by PT/OT who recommended: home, no needs  Patient is going home    Hospital course c/b hyponatemia, started on salt tabs    Exam on day of discharge:  GEN: laying in bed, appears well, NAD  NEURO: AOx3. FC, OE spont, speech intact, face symmetric. CNII-XII intact. +mild R facial numbness in V1/V2/V3 distribution . PERRL, EOMI. +mild b/l horizontal nystagmus. No pronator drift. MAEx4. 5/5 strength throughout.   CV: RRR +S1/S2  PULM: CTAB  GI: Abd soft, NT/ND  EXT: ext warm, dry, nontender   HPI:  61y Female with trigeminal neuralgia presenting with right sided facial pain  and numbness in right V2/V3 distribution with failed medical management, planned for   right microvascular decompression of trigeminal nerve with lumbar drain  placement    Hospital Course:  1/19: POD0 s/p right microvascular decompression of trigeminal nerve for trigeminal neuralgia with lumbar drain placement   1/20: POD1. TONIA o/n neuro stable. nausea post op, on compazine, scopolamine patch, zofran. LD draining 10cc/hr increased to 15/hr but experienced worsening headache and back pain, decreased back to 10cc/hr.   1/21: POD2 dilaudid IV given o/n for right low back pain, neuro stable, 200 cc bolus 3% given for hyponatremia, Dilaudid 0.25 given for pain.   1/22: POD3, overnight 3% at 30 and salt tabs started for hyponatremia. 1L fluid restriction in place. neuro stable, episode of twitching of R arm and R face in AM, CTH stable, patient pending stepdown, fluid restriction decreased to 800cc daily, US guided IV placed  1/23: POD4, 3% dc'd overnight, neuro stable, second US guided IV placed. Salt tabs increased to 3gq6, Na stable. Stepdown.   1/24: POD 5. TONIA overnight. exam stable. remains on salt tabs and fluid restriction for hyponatremia. pending home   1/25: POD6, TONIA overnight, neuro stable      Patient evaluated by PT/OT who recommended: home, no needs  Patient is going home    Hospital course c/b hyponatemia, started on salt tabs    Exam on day of discharge:  GEN: laying in bed, appears well, NAD  NEURO: AOx3. FC, OE spont, speech intact, face symmetric. CNII-XII intact. +mild R facial numbness in V1/V2/V3 distribution . PERRL, EOMI. +mild b/l horizontal nystagmus. No pronator drift. MAEx4. 5/5 strength throughout. CV: RRR +S1/S2  PULM: CTAB  GI: Abd soft, NT/ND  EXT: ext warm, dry, nontender    Patient is neuro stable, vitals stable, afebrile,  medically ready for discharge

## 2022-01-24 NOTE — PROGRESS NOTE ADULT - SUBJECTIVE AND OBJECTIVE BOX
HPI:  this info taken from patient's chart : " /..  61y Female with trigeminal neuralgia presenting with right sided facial pain  and numbness in right V2/V3 distribution with failed medical management, planned for   right microvascular decompression of trigeminal nerve with lumbar drain  placement."    Subjective: patient reports right facial numbness is improving.    Hospital Course:   1/19: POD0 s/p right microvascular decompression of trigeminal nerve for trigeminal neuralgia with lumbar drain placement   1/20: POD1. TONIA o/n neuro stable. nausea post op, on compazine, scopolamine patch, zofran. LD draining 10cc/hr increased to 15/hr but experienced worsening headache and back pain, decreased back to 10cc/hr.   1/21: POD2 dilaudid IV given o/n for right low back pain, neuro stable, 200 cc bolus 3% given for hyponatremia, Dilaudid 0.25 given for pain.   1/22: POD3, overnight 3% at 30 and salt tabs started for hyponatremia. 1L fluid restriction in place. neuro stable, episode of twitching of R arm and R face in AM, CTH stable, patient for stepdown, fluid restriction decreased to 800cc daily, US guided IV placed  1/23: POD4, 3% dc'd overnight, neuro stable, second US guided IV placed  1/24: POD 5. TONIA overnight. exam stable. remains on salt tabs and fluid restriction for hyponatremia. pending home no needs    Vital Signs Last 24 Hrs  T(C): 36.8 (23 Jan 2022 22:00), Max: 37.3 (23 Jan 2022 09:00)  T(F): 98.2 (23 Jan 2022 22:00), Max: 99.1 (23 Jan 2022 09:00)  HR: 82 (23 Jan 2022 20:36) (79 - 95)  BP: 160/78 (23 Jan 2022 20:36) (136/81 - 173/77)  BP(mean): 111 (23 Jan 2022 20:36) (97 - 112)  RR: 19 (23 Jan 2022 20:36) (16 - 29)  SpO2: 97% (23 Jan 2022 20:36) (95% - 98%)    I&O's Detail    22 Jan 2022 07:01  -  23 Jan 2022 07:00  --------------------------------------------------------  IN:    Oral Fluid: 800 mL    sodium chloride 3%: 390 mL  Total IN: 1190 mL    OUT:    Voided (mL): 1065 mL  Total OUT: 1065 mL    Total NET: 125 mL      23 Jan 2022 07:01  -  24 Jan 2022 00:07  --------------------------------------------------------  IN:    IV PiggyBack: 50 mL    Oral Fluid: 540 mL  Total IN: 590 mL    OUT:    Voided (mL): 1500 mL  Total OUT: 1500 mL    Total NET: -910 mL        I&O's Summary    22 Jan 2022 07:01  -  23 Jan 2022 07:00  --------------------------------------------------------  IN: 1190 mL / OUT: 1065 mL / NET: 125 mL    23 Jan 2022 07:01  -  24 Jan 2022 00:07  --------------------------------------------------------  IN: 590 mL / OUT: 1500 mL / NET: -910 mL      PHYSICAL EXAM:  GEN: laying in bed, appears well, NAD  NEURO: AOx3. FC, OE spont, speech intact, face symmetric. CNII-XII intact. +mild R facial numbness in V1/V2/V3 distribution . PERRL, EOMI. +mild b/l horizontal nystagmus. No pronator drift. MAEx4. 5/5 strength throughout.   CV: RRR +S1/S2  PULM: CTAB  GI: Abd soft, NT/ND  EXT: ext warm, dry, nontender      TUBES/LINES:  [] CVC  [] A-line  [] Lumbar Drain  [] Ventriculostomy  [] Other    DIET:  [] NPO  [] Mechanical  [] Tube feeds    LABS:                        11.2   12.93 )-----------( 167      ( 22 Jan 2022 05:43 )             34.4     01-23    136  |  100  |  8   ----------------------------<  130<H>  3.9   |  29  |  0.57    Ca    8.5      23 Jan 2022 09:43  Phos  3.2     01-22  Mg     1.8     01-23          CAPILLARY BLOOD GLUCOSE      POCT Blood Glucose.: 118 mg/dL (23 Jan 2022 11:13)  POCT Blood Glucose.: 127 mg/dL (23 Jan 2022 06:37)      Drug Levels: [] N/A    CSF Analysis: [] N/A      Allergies    No Known Allergies    Intolerances    dairy products (Stomach Upset)    MEDICATIONS:  Antibiotics:    Neuro:  acetaminophen     Tablet .. 650 milliGRAM(s) Oral every 6 hours PRN  gabapentin 600 milliGRAM(s) Oral at bedtime  ondansetron Injectable 4 milliGRAM(s) IV Push every 6 hours  OXcarbazepine 150 milliGRAM(s) Oral two times a day  oxycodone    5 mG/acetaminophen 325 mG 1 Tablet(s) Oral every 6 hours PRN  prochlorperazine   Injectable 10 milliGRAM(s) IV Push every 6 hours PRN  scopolamine 1 mG/72 Hr(s) Patch 1 Patch Transdermal every 72 hours    Anticoagulation:  enoxaparin Injectable 40 milliGRAM(s) SubCutaneous every 12 hours    OTHER:  atorvastatin 20 milliGRAM(s) Oral at bedtime  bisacodyl 5 milliGRAM(s) Oral every 12 hours PRN  chlorhexidine 2% Cloths 1 Application(s) Topical <User Schedule>  influenza   Vaccine 0.5 milliLiter(s) IntraMuscular once  pantoprazole    Tablet 40 milliGRAM(s) Oral before breakfast  polyethylene glycol 3350 17 Gram(s) Oral daily  senna 2 Tablet(s) Oral at bedtime  sucralfate 1 Gram(s) Oral four times a day    IVF:  sodium chloride 3 Gram(s) Oral every 6 hours    CULTURES:    RADIOLOGY & ADDITIONAL TESTS:      ASSESSMENT:  61y Female with trigeminal neuralgia presenting with right sided facial pain and numbness in right V2/V3 distribution with failed medical management, s/p right microvascular decompression of trigeminal nerve with lumbar drain placement (1/19/22). LD dc'd 1/22      G50.0    Handoff    MEWS Score    HTN (hypertension)    High cholesterol    Trigeminal neuralgia    Diverticulosis    Hiatal hernia    DM (diabetes mellitus)    Thyroid goiter    KORTNEY on CPAP    No pertinent past medical history    HTN (hypertension)    High cholesterol    Trigeminal neuralgia    Diverticulosis    Trigeminal neuralgia    Trigeminal neuralgia    Trigeminal neuralgia    Decompression, cranial nerve, microvascular    Microvascular decompression of trigeminal nerve    No significant past surgical history    History of cholecystectomy    H/O bilateral breast reduction surgery    Status post gamma knife treatment    No significant past surgical history    [id number]        PLAN:  NEURO:  -neuro/vitals q4hr  -pain control prn  - Percocet  -cont home trileptal, gabapentin  -CTH 1/22 stable, no hemorrhage    CARDIOVASCULAR:  -Normotensive BP goals  -QTC 1/23: 440  -Contnue lipitor    PULMONARY:  -room air, satting well   -hx of KORTNEY, sat goal > 92% ok  -Incentive spirometry    RENAL:  - 3% dc'd, salt tabs 3gq6  - 800cc fluid restriction  -straight cath prn   -electrolytes prn     GI:  - regular diet, poor PO intake  -bowel regimen  - cont home sucralfate and PPI  -nausea post op- zofran, compazine, scopolamine patch prns    HEME:  -trend h/h  -SCDs, SQL 40mg BID (Xa level 1/24)    ID:  - trend leukocytosis - improving  -afebrile    ENDO:  -n/a, prediabetic    DISPOSITION:   -SDU status, Full code  -PT/OT: pending home with no needs  -Discussed with Dr. Ferrer            Assessment:  Present when checked    []  GCS  E   V  M     Heart Failure: []Acute, [] acute on chronic , []chronic  Heart Failure:  [] Diastolic (HFpEF), [] Systolic (HFrEF), []Combined (HFpEF and HFrEF), [] RHF, [] Pulm HTN, [] Other    [] ERICA, [] ATN, [] AIN, [] other  [] CKD1, [] CKD2, [] CKD 3, [] CKD 4, [] CKD 5, []ESRD    Encephalopathy: [] Metabolic, [] Hepatic, [] toxic, [] Neurological, [] Other    Abnormal Nurtitional Status: [] malnurtition (see nutrition note), [ ]underweight: BMI < 19, [] morbid obesity: BMI >40, [] Cachexia    [] Sepsis  [] hypovolemic shock,[] cardiogenic shock, [] hemorrhagic shock, [] neuogenic shock  [] Acute Respiratory Failure  []Cerebral edema, [] Brain compression/ herniation,   [] Functional quadriplegia  [] Acute blood loss anemia

## 2022-01-24 NOTE — DISCHARGE NOTE PROVIDER - NSDCMRMEDTOKEN_GEN_ALL_CORE_FT
gabapentin 600 mg oral tablet: 1 tab(s) orally once a day (at bedtime)  omeprazole 40 mg oral delayed release capsule: 1 cap(s) orally once a day  OXcarbazepine 150 mg oral tablet: 1 tab(s) orally 2 times a day  rosuvastatin 5 mg oral tablet: 1 tab(s) orally once a day  sucralfate 1 g oral tablet: 1 tab(s) orally 4 times a day (before meals and at bedtime)   acetaminophen 325 mg oral tablet: 2 tab(s) orally every 6 hours, As needed, Temp greater or equal to 38C (100.4F), Mild Pain (1 - 3)  gabapentin 600 mg oral tablet: 1 tab(s) orally once a day (at bedtime)  omeprazole 40 mg oral delayed release capsule: 1 cap(s) orally once a day  OXcarbazepine 150 mg oral tablet: 1 tab(s) orally 2 times a day  polyethylene glycol 3350 oral powder for reconstitution: 17 gram(s) orally once a day, As Needed -for constipation   rosuvastatin 5 mg oral tablet: 1 tab(s) orally once a day  sodium chloride 1 g oral tablet: 3 tab(s) orally every 8 hours  sucralfate 1 g oral tablet: 1 tab(s) orally 4 times a day (before meals and at bedtime)  traMADol 50 mg oral tablet: 1 tab(s) orally every 6 hours, As Needed -Moderate Pain (4 - 6) - for severe pain MDD:4 tabs  Zofran 4 mg oral tablet: 1 tab(s) orally every 6 hours, As Needed -for nausea    acetaminophen 325 mg oral tablet: 2 tab(s) orally every 6 hours, As needed, Temp greater or equal to 38C (100.4F), Mild Pain (1 - 3)  gabapentin 600 mg oral tablet: 1 tab(s) orally once a day (at bedtime)  omeprazole 40 mg oral delayed release capsule: 1 cap(s) orally once a day  OXcarbazepine 150 mg oral tablet: 1 tab(s) orally 2 times a day  polyethylene glycol 3350 oral powder for reconstitution: 17 gram(s) orally once a day, As Needed -for constipation   rosuvastatin 5 mg oral tablet: 1 tab(s) orally once a day  sodium chloride 1 g oral tablet: 3 tab(s) orally every 8 hours  straight cane: use asdirected  sucralfate 1 g oral tablet: 1 tab(s) orally 4 times a day (before meals and at bedtime)  traMADol 50 mg oral tablet: 1 tab(s) orally every 6 hours, As Needed -Moderate Pain (4 - 6) - for severe pain MDD:4 tabs  Zofran 4 mg oral tablet: 1 tab(s) orally every 6 hours, As Needed -for nausea

## 2022-01-24 NOTE — DISCHARGE NOTE PROVIDER - NSDCCPCAREPLAN_GEN_ALL_CORE_FT
PRINCIPAL DISCHARGE DIAGNOSIS  Diagnosis: Trigeminal neuralgia  Assessment and Plan of Treatment:        PRINCIPAL DISCHARGE DIAGNOSIS  Diagnosis: Trigeminal neuralgia  Assessment and Plan of Treatment:       SECONDARY DISCHARGE DIAGNOSES  Diagnosis: DM (diabetes mellitus)  Assessment and Plan of Treatment: diet controlled    Diagnosis: KORTNEY on CPAP  Assessment and Plan of Treatment:     Diagnosis: Hiatal hernia  Assessment and Plan of Treatment:     Diagnosis: DM (diabetes mellitus)  Assessment and Plan of Treatment: diet controlled    Diagnosis: High cholesterol  Assessment and Plan of Treatment:      PRINCIPAL DISCHARGE DIAGNOSIS  Diagnosis: Trigeminal neuralgia  Assessment and Plan of Treatment:       SECONDARY DISCHARGE DIAGNOSES  Diagnosis: Hiatal hernia  Assessment and Plan of Treatment:     Diagnosis: High cholesterol  Assessment and Plan of Treatment:     Diagnosis: Hyperlipidemia  Assessment and Plan of Treatment:     Diagnosis: DM (diabetes mellitus)  Assessment and Plan of Treatment: diet controlled    Diagnosis: Hyponatremia  Assessment and Plan of Treatment:

## 2022-01-24 NOTE — PROGRESS NOTE ADULT - SUBJECTIVE AND OBJECTIVE BOX
O/N Events: TONIA  Subjective/ROS: Patient complaining of headache, dizziness, hear fullness, blurry vision. Denies CP, SOB, n/v, changes in bowel/urinary habits.  12pt ROS otherwise negative.    VITALS  Vital Signs Last 24 Hrs  T(C): 37.1 (24 Jan 2022 09:08), Max: 37.5 (24 Jan 2022 04:29)  T(F): 98.7 (24 Jan 2022 09:08), Max: 99.5 (24 Jan 2022 04:29)  HR: 86 (24 Jan 2022 08:45) (80 - 93)  BP: 157/74 (24 Jan 2022 08:28) (148/68 - 160/78)  BP(mean): 106 (24 Jan 2022 08:28) (97 - 111)  RR: 17 (24 Jan 2022 08:28) (17 - 25)  SpO2: 93% (24 Jan 2022 08:45) (92% - 98%)    CAPILLARY BLOOD GLUCOSE    PHYSICAL EXAM  General: A&Ox3; NAD  Head: Right temporal scar, davina-auricle swelling, MMM, EOMI  Neck: Supple; no JVD, no LAD  Respiratory: CTA B/L; no wheezes/crackles   Cardiovascular: Regular rhythm/rate; S1/S2   Gastrointestinal: Soft; NTND; normoactive BS  Extremities: WWP; +trace edema  Neurological:  CNII-XII grossly intact; no obvious focal deficits  Psych: Normal Affect  Skin: no rash     MEDICATIONS  (STANDING):  atorvastatin 20 milliGRAM(s) Oral at bedtime  chlorhexidine 2% Cloths 1 Application(s) Topical <User Schedule>  enoxaparin Injectable 40 milliGRAM(s) SubCutaneous every 12 hours  gabapentin 600 milliGRAM(s) Oral at bedtime  influenza   Vaccine 0.5 milliLiter(s) IntraMuscular once  magnesium sulfate  IVPB 2 Gram(s) IV Intermittent once  ondansetron Injectable 4 milliGRAM(s) IV Push every 6 hours  OXcarbazepine 150 milliGRAM(s) Oral two times a day  pantoprazole    Tablet 40 milliGRAM(s) Oral before breakfast  polyethylene glycol 3350 17 Gram(s) Oral daily  potassium chloride   Powder 40 milliEquivalent(s) Oral once  scopolamine 1 mG/72 Hr(s) Patch 1 Patch Transdermal every 72 hours  senna 2 Tablet(s) Oral at bedtime  sodium chloride 3 Gram(s) Oral every 8 hours  sucralfate 1 Gram(s) Oral four times a day    MEDICATIONS  (PRN):  acetaminophen     Tablet .. 650 milliGRAM(s) Oral every 6 hours PRN Temp greater or equal to 38C (100.4F), Mild Pain (1 - 3)  bisacodyl 5 milliGRAM(s) Oral every 12 hours PRN Constipation  oxycodone    5 mG/acetaminophen 325 mG 1 Tablet(s) Oral every 6 hours PRN Severe Pain (7 - 10)  prochlorperazine   Injectable 10 milliGRAM(s) IV Push every 6 hours PRN n/v  traMADol 50 milliGRAM(s) Oral every 4 hours PRN Moderate Pain (4 - 6)      dairy products (Stomach Upset)  No Known Allergies      LABS                        11.3   6.22  )-----------( 160      ( 24 Jan 2022 06:33 )             33.5     01-24    137  |  98  |  6<L>  ----------------------------<  120<H>  3.7   |  29  |  0.57    Ca    8.4      24 Jan 2022 06:33  Phos  4.0     01-24  Mg     1.8     01-24                IMAGING/EKG/ETC: reviewed

## 2022-01-24 NOTE — DISCHARGE NOTE PROVIDER - NSDCFUADDAPPT_GEN_ALL_CORE_FT
Please follow up with Dr. Ferrer in 1-2 weeks.     Please follow up with your primary care doctor to check your sodium levels within 1 week.  Please follow up with Dr. Ferrer in 1-2 weeks.     Please follow up with your primary care doctor Dr. Cohn on 1/31/22 at 2PM to check your sodium level.  Please follow up with Dr. Ferrer in 1-2 weeks.     Please follow up with your primary care doctor Dr. Cohn on 1/31/22 at 2PM to check your sodium level. Please continue 1L fluid restriction at home.

## 2022-01-24 NOTE — DISCHARGE NOTE PROVIDER - NSDCCPTREATMENT_GEN_ALL_CORE_FT
PRINCIPAL PROCEDURE  Procedure: Microvascular decompression of trigeminal nerve  Findings and Treatment: right side      SECONDARY PROCEDURE  Procedure: Insertion of lumbar drain  Findings and Treatment:

## 2022-01-25 ENCOUNTER — TRANSCRIPTION ENCOUNTER (OUTPATIENT)
Age: 62
End: 2022-01-25

## 2022-01-25 VITALS — SYSTOLIC BLOOD PRESSURE: 138 MMHG | DIASTOLIC BLOOD PRESSURE: 65 MMHG | OXYGEN SATURATION: 96 %

## 2022-01-25 DIAGNOSIS — G47.33 OBSTRUCTIVE SLEEP APNEA (ADULT) (PEDIATRIC): ICD-10-CM

## 2022-01-25 LAB
ANION GAP SERPL CALC-SCNC: 9 MMOL/L — SIGNIFICANT CHANGE UP (ref 5–17)
BUN SERPL-MCNC: 7 MG/DL — SIGNIFICANT CHANGE UP (ref 7–23)
CALCIUM SERPL-MCNC: 8.8 MG/DL — SIGNIFICANT CHANGE UP (ref 8.4–10.5)
CHLORIDE SERPL-SCNC: 100 MMOL/L — SIGNIFICANT CHANGE UP (ref 96–108)
CO2 SERPL-SCNC: 26 MMOL/L — SIGNIFICANT CHANGE UP (ref 22–31)
CREAT SERPL-MCNC: 0.54 MG/DL — SIGNIFICANT CHANGE UP (ref 0.5–1.3)
GLUCOSE SERPL-MCNC: 128 MG/DL — HIGH (ref 70–99)
HCT VFR BLD CALC: 38.1 % — SIGNIFICANT CHANGE UP (ref 34.5–45)
HGB BLD-MCNC: 12.8 G/DL — SIGNIFICANT CHANGE UP (ref 11.5–15.5)
MAGNESIUM SERPL-MCNC: 1.8 MG/DL — SIGNIFICANT CHANGE UP (ref 1.6–2.6)
MCHC RBC-ENTMCNC: 30.8 PG — SIGNIFICANT CHANGE UP (ref 27–34)
MCHC RBC-ENTMCNC: 33.6 GM/DL — SIGNIFICANT CHANGE UP (ref 32–36)
MCV RBC AUTO: 91.8 FL — SIGNIFICANT CHANGE UP (ref 80–100)
NRBC # BLD: 0 /100 WBCS — SIGNIFICANT CHANGE UP (ref 0–0)
PHOSPHATE SERPL-MCNC: 3.9 MG/DL — SIGNIFICANT CHANGE UP (ref 2.5–4.5)
PLATELET # BLD AUTO: 200 K/UL — SIGNIFICANT CHANGE UP (ref 150–400)
POTASSIUM SERPL-MCNC: 4.2 MMOL/L — SIGNIFICANT CHANGE UP (ref 3.5–5.3)
POTASSIUM SERPL-SCNC: 4.2 MMOL/L — SIGNIFICANT CHANGE UP (ref 3.5–5.3)
RBC # BLD: 4.15 M/UL — SIGNIFICANT CHANGE UP (ref 3.8–5.2)
RBC # FLD: 13.1 % — SIGNIFICANT CHANGE UP (ref 10.3–14.5)
SODIUM SERPL-SCNC: 135 MMOL/L — SIGNIFICANT CHANGE UP (ref 135–145)
WBC # BLD: 7.36 K/UL — SIGNIFICANT CHANGE UP (ref 3.8–10.5)
WBC # FLD AUTO: 7.36 K/UL — SIGNIFICANT CHANGE UP (ref 3.8–10.5)

## 2022-01-25 PROCEDURE — 83935 ASSAY OF URINE OSMOLALITY: CPT

## 2022-01-25 PROCEDURE — 83036 HEMOGLOBIN GLYCOSYLATED A1C: CPT

## 2022-01-25 PROCEDURE — C1729: CPT

## 2022-01-25 PROCEDURE — 82962 GLUCOSE BLOOD TEST: CPT

## 2022-01-25 PROCEDURE — 85027 COMPLETE CBC AUTOMATED: CPT

## 2022-01-25 PROCEDURE — 97530 THERAPEUTIC ACTIVITIES: CPT

## 2022-01-25 PROCEDURE — 70450 CT HEAD/BRAIN W/O DYE: CPT

## 2022-01-25 PROCEDURE — 36415 COLL VENOUS BLD VENIPUNCTURE: CPT

## 2022-01-25 PROCEDURE — C1781: CPT

## 2022-01-25 PROCEDURE — C1889: CPT

## 2022-01-25 PROCEDURE — 99024 POSTOP FOLLOW-UP VISIT: CPT

## 2022-01-25 PROCEDURE — 82570 ASSAY OF URINE CREATININE: CPT

## 2022-01-25 PROCEDURE — 83735 ASSAY OF MAGNESIUM: CPT

## 2022-01-25 PROCEDURE — 84300 ASSAY OF URINE SODIUM: CPT

## 2022-01-25 PROCEDURE — 86803 HEPATITIS C AB TEST: CPT

## 2022-01-25 PROCEDURE — 86900 BLOOD TYPING SEROLOGIC ABO: CPT

## 2022-01-25 PROCEDURE — 85025 COMPLETE CBC W/AUTO DIFF WBC: CPT

## 2022-01-25 PROCEDURE — 81003 URINALYSIS AUTO W/O SCOPE: CPT

## 2022-01-25 PROCEDURE — C1713: CPT

## 2022-01-25 PROCEDURE — 97535 SELF CARE MNGMENT TRAINING: CPT

## 2022-01-25 PROCEDURE — 84100 ASSAY OF PHOSPHORUS: CPT

## 2022-01-25 PROCEDURE — 80053 COMPREHEN METABOLIC PANEL: CPT

## 2022-01-25 PROCEDURE — 83930 ASSAY OF BLOOD OSMOLALITY: CPT

## 2022-01-25 PROCEDURE — 86850 RBC ANTIBODY SCREEN: CPT

## 2022-01-25 PROCEDURE — 86901 BLOOD TYPING SEROLOGIC RH(D): CPT

## 2022-01-25 PROCEDURE — 85520 HEPARIN ASSAY: CPT

## 2022-01-25 PROCEDURE — 80048 BASIC METABOLIC PNL TOTAL CA: CPT

## 2022-01-25 PROCEDURE — 97116 GAIT TRAINING THERAPY: CPT

## 2022-01-25 PROCEDURE — 97162 PT EVAL MOD COMPLEX 30 MIN: CPT

## 2022-01-25 RX ORDER — ONDANSETRON 8 MG/1
1 TABLET, FILM COATED ORAL
Qty: 12 | Refills: 0
Start: 2022-01-25 | End: 2022-01-27

## 2022-01-25 RX ORDER — SODIUM CHLORIDE 9 MG/ML
3 INJECTION INTRAMUSCULAR; INTRAVENOUS; SUBCUTANEOUS
Qty: 126 | Refills: 0
Start: 2022-01-25 | End: 2022-02-07

## 2022-01-25 RX ORDER — TRAMADOL HYDROCHLORIDE 50 MG/1
1 TABLET ORAL
Qty: 12 | Refills: 0
Start: 2022-01-25 | End: 2022-01-27

## 2022-01-25 RX ORDER — ACETAMINOPHEN 500 MG
2 TABLET ORAL
Qty: 0 | Refills: 0 | DISCHARGE
Start: 2022-01-25

## 2022-01-25 RX ORDER — POLYETHYLENE GLYCOL 3350 17 G/17G
17 POWDER, FOR SOLUTION ORAL
Qty: 238 | Refills: 0
Start: 2022-01-25 | End: 2022-02-07

## 2022-01-25 RX ADMIN — Medication 650 MILLIGRAM(S): at 06:22

## 2022-01-25 RX ADMIN — ONDANSETRON 4 MILLIGRAM(S): 8 TABLET, FILM COATED ORAL at 06:23

## 2022-01-25 RX ADMIN — SODIUM CHLORIDE 3 GRAM(S): 9 INJECTION INTRAMUSCULAR; INTRAVENOUS; SUBCUTANEOUS at 13:04

## 2022-01-25 RX ADMIN — Medication 1 GRAM(S): at 13:03

## 2022-01-25 RX ADMIN — ONDANSETRON 4 MILLIGRAM(S): 8 TABLET, FILM COATED ORAL at 00:38

## 2022-01-25 RX ADMIN — SCOPALAMINE 1 PATCH: 1 PATCH, EXTENDED RELEASE TRANSDERMAL at 07:43

## 2022-01-25 RX ADMIN — Medication 650 MILLIGRAM(S): at 13:03

## 2022-01-25 RX ADMIN — POLYETHYLENE GLYCOL 3350 17 GRAM(S): 17 POWDER, FOR SOLUTION ORAL at 13:04

## 2022-01-25 RX ADMIN — Medication 1 GRAM(S): at 07:21

## 2022-01-25 RX ADMIN — OXCARBAZEPINE 150 MILLIGRAM(S): 300 TABLET, FILM COATED ORAL at 06:22

## 2022-01-25 RX ADMIN — LACTULOSE 10 GRAM(S): 10 SOLUTION ORAL at 06:23

## 2022-01-25 RX ADMIN — Medication 1 GRAM(S): at 00:38

## 2022-01-25 RX ADMIN — ENOXAPARIN SODIUM 40 MILLIGRAM(S): 100 INJECTION SUBCUTANEOUS at 00:07

## 2022-01-25 RX ADMIN — PANTOPRAZOLE SODIUM 40 MILLIGRAM(S): 20 TABLET, DELAYED RELEASE ORAL at 06:23

## 2022-01-25 RX ADMIN — ENOXAPARIN SODIUM 40 MILLIGRAM(S): 100 INJECTION SUBCUTANEOUS at 13:05

## 2022-01-25 RX ADMIN — SODIUM CHLORIDE 3 GRAM(S): 9 INJECTION INTRAMUSCULAR; INTRAVENOUS; SUBCUTANEOUS at 07:21

## 2022-01-25 RX ADMIN — Medication 650 MILLIGRAM(S): at 07:22

## 2022-01-25 NOTE — DISCHARGE NOTE NURSING/CASE MANAGEMENT/SOCIAL WORK - NSDCPEFALRISK_GEN_ALL_CORE
For information on Fall & Injury Prevention, visit: [email or web address] OR  [email or web address] OR  https://www.cdc.gov/steadi/patient.html

## 2022-01-25 NOTE — DISCHARGE NOTE NURSING/CASE MANAGEMENT/SOCIAL WORK - NSDCFUADDAPPT_GEN_ALL_CORE_FT
Please follow up with Dr. Ferrer in 1-2 weeks.     Please follow up with your primary care doctor to check your sodium levels within 1 week.

## 2022-01-25 NOTE — PROGRESS NOTE ADULT - SUBJECTIVE AND OBJECTIVE BOX
HPI:  this info taken from patient's chart : " /..  61y Female with trigeminal neuralgia presenting with right sided facial pain  and numbness in right V2/V3 distribution with failed medical management, planned for   right microvascular decompression of trigeminal nerve with lumbar drain  placement."      (19 Jan 2022 21:13)    Hospital Course:  1/19: POD0 s/p right microvascular decompression of trigeminal nerve for trigeminal neuralgia with lumbar drain placement   1/20: POD1. TONIA o/n neuro stable. nausea post op, on compazine, scopolamine patch, zofran. LD draining 10cc/hr increased to 15/hr but experienced worsening headache and back pain, decreased back to 10cc/hr.   1/21: POD2 dilaudid IV given o/n for right low back pain, neuro stable, 200 cc bolus 3% given for hyponatremia, Dilaudid 0.25 given for pain.   1/22: POD3, overnight 3% at 30 and salt tabs started for hyponatremia. 1L fluid restriction in place. neuro stable, episode of twitching of R arm and R face in AM, CTH stable, patient pending stepdown, fluid restriction decreased to 800cc daily, US guided IV placed  1/23: POD4, 3% dc'd overnight, neuro stable, second US guided IV placed. Salt tabs increased to 3gq6, Na stable. Stepdown.   1/24: POD 5. TONIA overnight. exam stable. weaning salt tabs, and fluid restriction for hyponatremia. pending home no needs  1/25: POD6, TONIA overnight, neuro stable    Vital Signs Last 24 Hrs  T(C): 36.8 (25 Jan 2022 00:05), Max: 37.5 (24 Jan 2022 04:29)  T(F): 98.2 (25 Jan 2022 00:05), Max: 99.5 (24 Jan 2022 04:29)  HR: 75 (25 Jan 2022 00:05) (75 - 86)  BP: 114/77 (25 Jan 2022 00:05) (114/77 - 160/83)  BP(mean): 116 (24 Jan 2022 10:25) (106 - 116)  RR: 17 (25 Jan 2022 00:05) (17 - 18)  SpO2: 96% (25 Jan 2022 00:05) (93% - 97%)    I&O's Summary    23 Jan 2022 07:01  -  24 Jan 2022 07:00  --------------------------------------------------------  IN: 590 mL / OUT: 2000 mL / NET: -1410 mL    24 Jan 2022 07:01  -  25 Jan 2022 01:55  --------------------------------------------------------  IN: 360 mL / OUT: 500 mL / NET: -140 mL        PHYSICAL EXAM:  GEN: laying in bed, appears well, NAD  NEURO: AOx3. FC, OE spont, speech intact, face symmetric. CNII-XII intact. +mild R facial numbness in V1/V2/V3 distribution . PERRL, EOMI. +mild b/l horizontal nystagmus. No pronator drift. MAEx4. 5/5 strength throughout.   CV: RRR +S1/S2  PULM: CTAB  GI: Abd soft, NT/ND  EXT: ext warm, dry, nontender    TUBES/LINES:  [] Heard  [] Lumbar Drain  [] Wound Drains  [] Others      DIET:  [] NPO  [x] Mechanical  [] Tube feeds    LABS:                        11.3   6.22  )-----------( 160      ( 24 Jan 2022 06:33 )             33.5     01-24    137  |  98  |  6<L>  ----------------------------<  120<H>  3.7   |  29  |  0.57    Ca    8.4      24 Jan 2022 06:33  Phos  4.0     01-24  Mg     1.8     01-24              CAPILLARY BLOOD GLUCOSE          Drug Levels: [] N/A    CSF Analysis: [] N/A      Allergies    No Known Allergies    Intolerances    dairy products (Stomach Upset)    MEDICATIONS:  Antibiotics:    Neuro:  acetaminophen     Tablet .. 650 milliGRAM(s) Oral every 6 hours PRN  gabapentin 600 milliGRAM(s) Oral at bedtime  ondansetron Injectable 4 milliGRAM(s) IV Push every 6 hours  OXcarbazepine 150 milliGRAM(s) Oral two times a day  oxycodone    5 mG/acetaminophen 325 mG 1 Tablet(s) Oral every 6 hours PRN  prochlorperazine   Injectable 10 milliGRAM(s) IV Push every 6 hours PRN  scopolamine 1 mG/72 Hr(s) Patch 1 Patch Transdermal every 72 hours  traMADol 50 milliGRAM(s) Oral every 4 hours PRN    Anticoagulation:  enoxaparin Injectable 40 milliGRAM(s) SubCutaneous every 12 hours    OTHER:  atorvastatin 20 milliGRAM(s) Oral at bedtime  bisacodyl 5 milliGRAM(s) Oral every 12 hours PRN  influenza   Vaccine 0.5 milliLiter(s) IntraMuscular once  lactulose Syrup 10 Gram(s) Oral once  pantoprazole    Tablet 40 milliGRAM(s) Oral before breakfast  polyethylene glycol 3350 17 Gram(s) Oral daily  senna 2 Tablet(s) Oral at bedtime  sucralfate 1 Gram(s) Oral four times a day    IVF:  sodium chloride 3 Gram(s) Oral every 8 hours    CULTURES:    RADIOLOGY & ADDITIONAL TESTS:      ASSESSMENT:  61y Female with trigeminal neuralgia presenting with right sided facial pain and numbness in right V2/V3 distribution with failed medical management, s/p right microvascular decompression of trigeminal nerve with lumbar drain placement (1/19/22). LD removed 1/22. Post op course: nausea, pain control.     NEURO:  -neuro/vitals q4hr  -pain control prn    -cont home trileptal, gabapentin  -CTH 1/22 stable, no hemorrhage    CARDIOVASCULAR:  -Normotensive BP goals  -Daily EKG  -Contnue lipitor    PULMONARY:  -room air, satting well   -hx of KORTNEY, sat goal > 92% ok  -Incentive spirometry    RENAL:  - 3% dc'd, salt tabs 3gq6  - 800cc fluid restriction  -straight cath prn   -electrolytes prn     GI:  - regular diet, poor PO intake  -bowel regimen  - cont home sucralfate and PPI  -nausea post op- zofran, compazine, scopolamine patch prns    HEME:  -trend h/h  -SCDs, SQL 40mg BID (Xa level 1/24 appropriate)    ID:  - trend leukocytosis - improving  -afebrile    ENDO:  -n/a, prediabetic    DISPOSITION:   -SDU status, Full code  -PT/OT: pending home with no needs  -Discussed with Dr. Ferrer

## 2022-01-25 NOTE — CHART NOTE - NSCHARTNOTEFT_GEN_A_CORE
Admitting Diagnosis:   Patient is a 61y old  Female who presents with a chief complaint of trigeminal neuralgia (24 Jan 2022 07:48)      PAST MEDICAL & SURGICAL HISTORY:  High cholesterol    Trigeminal neuralgia    Diverticulosis    Hiatal hernia    DM (diabetes mellitus)  diet controlled    Thyroid goiter    KORTNEY on CPAP    History of cholecystectomy    H/O bilateral breast reduction surgery    Status post gamma knife treatment  for trigeminal neuralgia, 2017        Current Nutrition Order:  regular, 800ml fluid restriction    PO Intake: Good (%) [   ]  Fair (50-75%) [ x  ] Poor (<25%) [   ]  had scrambled eggs, croissant, turkey miguel, 8oz water    GI Issues:   Has not had a BM since 1/19; reports she usually goes daily  Ordered for senna, carafate, miralax, protonix, zofran    Pain:  No pain reported  Ordered for oxycodone, tramadol    Skin Integrity:  Surgical incision  gaviota score 20     Labs:   01-24    137  |  98  |  6<L>  ----------------------------<  120<H>  3.7   |  29  |  0.57    Ca    8.4      24 Jan 2022 06:33  Phos  4.0     01-24  Mg     1.8     01-24      CAPILLARY BLOOD GLUCOSE          Medications:  MEDICATIONS  (STANDING):  atorvastatin 20 milliGRAM(s) Oral at bedtime  enoxaparin Injectable 40 milliGRAM(s) SubCutaneous every 12 hours  gabapentin 600 milliGRAM(s) Oral at bedtime  influenza   Vaccine 0.5 milliLiter(s) IntraMuscular once  ondansetron Injectable 4 milliGRAM(s) IV Push every 6 hours  OXcarbazepine 150 milliGRAM(s) Oral two times a day  pantoprazole    Tablet 40 milliGRAM(s) Oral before breakfast  polyethylene glycol 3350 17 Gram(s) Oral daily  scopolamine 1 mG/72 Hr(s) Patch 1 Patch Transdermal every 72 hours  senna 2 Tablet(s) Oral at bedtime  sodium chloride 3 Gram(s) Oral every 8 hours  sucralfate 1 Gram(s) Oral four times a day    MEDICATIONS  (PRN):  acetaminophen     Tablet .. 650 milliGRAM(s) Oral every 6 hours PRN Temp greater or equal to 38C (100.4F), Mild Pain (1 - 3)  bisacodyl 5 milliGRAM(s) Oral every 12 hours PRN Constipation  oxycodone    5 mG/acetaminophen 325 mG 1 Tablet(s) Oral every 6 hours PRN Severe Pain (7 - 10)  prochlorperazine   Injectable 10 milliGRAM(s) IV Push every 6 hours PRN n/v  traMADol 50 milliGRAM(s) Oral every 4 hours PRN Moderate Pain (4 - 6)      Admitted Anthropometrics:  Height: 5'3" Weight: 201lbs, IBW 115lbs+/-10%, %%, BMI 35.7    Weight Change:   No new weights this admission    Estimated energy needs:   IBW used for calculations as pt >120% of IBW (175%).   Nutrient needs based on [facility] standards of care for maintenance in adults.   Needs adjusted for wound healing post-op.  1302-1563kcal/day (25-30kcal/kg)  63-73g pro/day (1.2-1.4g pro/kg)  Fluids per team    Subjective:   61y Female with trigeminal neuralgia presenting with right sided facial pain and numbness in right V2/V3 distribution with failed medical management, s/p right microvascular decompression of trigeminal nerve with lumbar drain placement (1/19/22). LD removed 1/22. Post op course: nausea, pain control.    Pt seen in room, resting in bed. Currently on a 800ml fluid restriction and regular diet. Salt tabs discontinued. Sodium appears to have normalized- timeline of fluid restriction unclear and per neuro. Denies N/V, no abdominal discomfort.  Of note, patient has not had a BM since 1/19. Discussed higher fiber foods to assist w/ stool softeners. Ordered for miralax, senna. Endorsing a fair appetite- consumed scrambled eggs, croissant, turkey miguel. No known weight changes. Intolerance to dairy noted in EMR. Notable labs: Osmolality 118 (H), A1C 5.7 pre-diabetes. Consider more aggressive bowel regimen and/or liberalizing fluid restriction to aid in bowel movements. RD to follow.    Previous Nutrition Diagnosis:  Increased nutrient needs RT post-op wound healing AEB s/p decompression 1/19  Active [   x]  Resolved [   ]    If resolved, new PES:     Goal:  Pt to consistently meet % of estimated needs PO     Recommendations:  1) Continue on regular diet  2) Honor pts food preferences  3) Encourage protein options 2/2 increased needs post-op   4. Encourage fiber options to work best in conjunction with stool softeners  5. Consider more aggressive bowel regimen and/or liberalizing fluid restriction to aid in bowel movements.    Education:   fibe and protein. conversion of 800ml into cups (3.3 cups)    Risk Level: High [   ] Moderate [ x  ] Low [   ]
Neurosurgical Indications for Screening Dopplers on Admission:       1) Known hypercoagulation disorder (h/o VTE, thrombophilia, HIT, etc.)   2) Admitted from prolonged stay from another institution (straight forward ER transfers not included)  3) Presenting with significant leg immobility   4) Presenting with signs and symptoms of VTE?    5) With significant critical illness, Including "found down" for unknown period of time in HPI  6) With significant neurotrauma (TBI, SCI / TLS spine fractures)   7) Who are comatose   8) With known malignancy (e.g. glioblastoma multiforme, meningioma, etc.). Excludes IA chemo 23hr observation stays  9) On hemodialysis   10) Who have received platelet transfusion or antithrombotic reversal agents recently   11) Who have had recent major orthopedic surgery          Screening dopplers indicated?   Y _   N x    DVT Prophylaxis:  x SCD's   _ chemoprophylaxis

## 2022-01-25 NOTE — DISCHARGE NOTE NURSING/CASE MANAGEMENT/SOCIAL WORK - PATIENT PORTAL LINK FT
You can access the FollowMyHealth Patient Portal offered by [facility] by registering at the following website: [email or web address]. By joining [facility]’s FollowMyHealth portal, you will also be able to view your health information using other applications (apps) compatible with our system.

## 2022-01-25 NOTE — PROGRESS NOTE ADULT - PROVIDER SPECIALTY LIST ADULT
NSICU
Neurosurgery
NSICU
Neurosurgery
NSICU
NSICU
Hospitalist

## 2022-02-01 ENCOUNTER — RX RENEWAL (OUTPATIENT)
Age: 62
End: 2022-02-01

## 2022-03-07 ENCOUNTER — RX RENEWAL (OUTPATIENT)
Age: 62
End: 2022-03-07

## 2022-03-07 RX ORDER — SUCRALFATE 1 G/1
1 TABLET ORAL 4 TIMES DAILY
Qty: 360 | Refills: 0 | Status: ACTIVE | COMMUNITY
Start: 2021-12-13 | End: 1900-01-01

## 2022-05-10 NOTE — DISCHARGE NOTE NURSING/CASE MANAGEMENT/SOCIAL WORK - NSSCNAMETXT_GEN_ALL_CORE
Ziprasidone sent from refill encounter.     Dr. VALLEJO, are you recommending that he stay on 150 mg venlafaxine then or go back to the 300 mg? Due to the surgeries scheduled, he would not be able to even start PHP for a few weeks. 225 mg?   [facility] at home

## 2022-06-21 NOTE — PATIENT PROFILE ADULT - FALL HARM RISK - FALL HARM RISK
What Type Of Note Output Would You Prefer (Optional)?: Bullet Format How Severe Is Your Rash?: mild Is This A New Presentation, Or A Follow-Up?: Rash No indicators present

## 2022-06-27 ENCOUNTER — RX RENEWAL (OUTPATIENT)
Age: 62
End: 2022-06-27

## 2022-09-12 NOTE — OCCUPATIONAL THERAPY INITIAL EVALUATION ADULT - PHYSICAL ASSIST/NONPHYSICAL ASSIST, REHAB EVAL
Alyssa Barrientos  [address] [facility] [address]            9/12/2022      Dear Alyssa,    Recently you were referred to our Gastroenterology Department by your primary care provider for a colonoscopy.  I would like to share some important information about a colonoscopy. Colorectal cancer is a leading cause of cancer death in this country. Colorectal cancer can be stopped in its tracks or even prevented with a  colonoscopy.    Please call [phone] to schedule your procedure with one of our Gastroenterology providers at either [facility],  [facility], or  [facility].  If you already spoke to someone in the GI Department and have your procedure scheduled you can disregard this letter.     If you decide to have your colonoscopy elsewhere, please call us at [phone] with the information so that we can update your record.    Your good health is important to us, a colonoscopy is important for you.    Sincerely,      [facility]  [address] [facility] [address]     verbal cues/nonverbal cues (demo/gestures)/1 person assist

## 2023-01-16 ENCOUNTER — RX RENEWAL (OUTPATIENT)
Age: 63
End: 2023-01-16

## 2023-03-16 NOTE — ED ADULT NURSE NOTE - BREATH SOUNDS, MLM
Clear Cimzia Pregnancy And Lactation Text: This medication crosses the placenta but can be considered safe in certain situations. Cimzia may be excreted in breast milk.

## 2023-07-25 ENCOUNTER — RX RENEWAL (OUTPATIENT)
Age: 63
End: 2023-07-25

## 2023-07-25 NOTE — ED PROVIDER NOTE - GENITOURINARY [+], MLM
Patient called and said the medication he is taking for back pain is not working for him. BURNING ON URINATION./DIFFICULTY URINATING/HEMATURIA

## 2024-01-17 ENCOUNTER — RX RENEWAL (OUTPATIENT)
Age: 64
End: 2024-01-17

## 2024-04-03 PROBLEM — G47.33 OBSTRUCTIVE SLEEP APNEA (ADULT) (PEDIATRIC): Chronic | Status: ACTIVE | Noted: 2022-01-18

## 2024-04-03 PROBLEM — E11.9 TYPE 2 DIABETES MELLITUS WITHOUT COMPLICATIONS: Chronic | Status: ACTIVE | Noted: 2022-01-18

## 2024-04-03 PROBLEM — E04.9 NONTOXIC GOITER, UNSPECIFIED: Chronic | Status: ACTIVE | Noted: 2022-01-18

## 2024-07-01 ENCOUNTER — RX RENEWAL (OUTPATIENT)
Age: 64
End: 2024-07-01

## 2024-07-09 ENCOUNTER — APPOINTMENT (OUTPATIENT)
Dept: GASTROENTEROLOGY | Facility: CLINIC | Age: 64
End: 2024-07-09
Payer: COMMERCIAL

## 2024-07-09 VITALS
HEIGHT: 62 IN | OXYGEN SATURATION: 98 % | DIASTOLIC BLOOD PRESSURE: 98 MMHG | HEART RATE: 81 BPM | RESPIRATION RATE: 16 BRPM | WEIGHT: 190 LBS | BODY MASS INDEX: 34.96 KG/M2 | SYSTOLIC BLOOD PRESSURE: 150 MMHG

## 2024-07-09 VITALS — DIASTOLIC BLOOD PRESSURE: 89 MMHG | SYSTOLIC BLOOD PRESSURE: 140 MMHG

## 2024-07-09 DIAGNOSIS — K21.9 GASTRO-ESOPHAGEAL REFLUX DISEASE W/OUT ESOPHAGITIS: ICD-10-CM

## 2024-07-09 DIAGNOSIS — G47.30 SLEEP APNEA, UNSPECIFIED: ICD-10-CM

## 2024-07-09 DIAGNOSIS — R05.9 COUGH, UNSPECIFIED: ICD-10-CM

## 2024-07-09 DIAGNOSIS — R10.13 EPIGASTRIC PAIN: ICD-10-CM

## 2024-07-09 PROCEDURE — 99204 OFFICE O/P NEW MOD 45 MIN: CPT

## 2024-07-23 ENCOUNTER — RESULT REVIEW (OUTPATIENT)
Age: 64
End: 2024-07-23

## 2024-07-27 ENCOUNTER — APPOINTMENT (OUTPATIENT)
Dept: CT IMAGING | Facility: CLINIC | Age: 64
End: 2024-07-27
Payer: COMMERCIAL

## 2024-07-27 PROCEDURE — 74177 CT ABD & PELVIS W/CONTRAST: CPT | Mod: 26

## 2024-08-01 ENCOUNTER — NON-APPOINTMENT (OUTPATIENT)
Age: 64
End: 2024-08-01

## 2024-08-01 RX ORDER — FAMOTIDINE 40 MG/1
40 TABLET, FILM COATED ORAL
Qty: 90 | Refills: 3 | Status: ACTIVE | COMMUNITY
Start: 2024-08-01 | End: 1900-01-01

## 2024-09-16 ENCOUNTER — TRANSCRIPTION ENCOUNTER (OUTPATIENT)
Age: 64
End: 2024-09-16

## 2024-09-17 ENCOUNTER — RESULT REVIEW (OUTPATIENT)
Age: 64
End: 2024-09-17

## 2024-09-17 ENCOUNTER — OUTPATIENT (OUTPATIENT)
Dept: OUTPATIENT SERVICES | Facility: HOSPITAL | Age: 64
LOS: 1 days | End: 2024-09-17
Payer: COMMERCIAL

## 2024-09-17 ENCOUNTER — APPOINTMENT (OUTPATIENT)
Dept: GASTROENTEROLOGY | Facility: GI CENTER | Age: 64
End: 2024-09-17
Payer: COMMERCIAL

## 2024-09-17 DIAGNOSIS — Z92.3 PERSONAL HISTORY OF IRRADIATION: Chronic | ICD-10-CM

## 2024-09-17 DIAGNOSIS — K44.9 DIAPHRAGMATIC HERNIA W/OUT OBSTRUCTION OR GANGRENE: ICD-10-CM

## 2024-09-17 DIAGNOSIS — R10.9 UNSPECIFIED ABDOMINAL PAIN: ICD-10-CM

## 2024-09-17 DIAGNOSIS — K21.00 DIAPHRAGMATIC HERNIA W/OUT OBSTRUCTION OR GANGRENE: ICD-10-CM

## 2024-09-17 DIAGNOSIS — K21.9 GASTRO-ESOPHAGEAL REFLUX DISEASE W/OUT ESOPHAGITIS: ICD-10-CM

## 2024-09-17 DIAGNOSIS — K21.9 GASTRO-ESOPHAGEAL REFLUX DISEASE WITHOUT ESOPHAGITIS: ICD-10-CM

## 2024-09-17 DIAGNOSIS — Z98.890 OTHER SPECIFIED POSTPROCEDURAL STATES: Chronic | ICD-10-CM

## 2024-09-17 DIAGNOSIS — Z90.49 ACQUIRED ABSENCE OF OTHER SPECIFIED PARTS OF DIGESTIVE TRACT: Chronic | ICD-10-CM

## 2024-09-17 PROCEDURE — 88342 IMHCHEM/IMCYTCHM 1ST ANTB: CPT

## 2024-09-17 PROCEDURE — 43239 EGD BIOPSY SINGLE/MULTIPLE: CPT

## 2024-09-17 PROCEDURE — 88305 TISSUE EXAM BY PATHOLOGIST: CPT | Mod: 26

## 2024-09-17 PROCEDURE — 88342 IMHCHEM/IMCYTCHM 1ST ANTB: CPT | Mod: 26

## 2024-09-17 PROCEDURE — 88305 TISSUE EXAM BY PATHOLOGIST: CPT

## 2024-09-17 NOTE — PHYSICAL EXAM
[Alert] : alert [Normal Voice/Communication] : normal voice/communication [Healthy Appearing] : healthy appearing [No Acute Distress] : no acute distress [Sclera] : the sclera and conjunctiva were normal [Hearing Threshold Finger Rub Not [place]] : hearing was normal [Normal Lips/Gums] : the lips and gums were normal [Oropharynx] : the oropharynx was normal [Normal Appearance] : the appearance of the neck was normal [No Neck Mass] : no neck mass was observed [No Respiratory Distress] : no respiratory distress [No Acc Muscle Use] : no accessory muscle use [Respiration, Rhythm And Depth] : normal respiratory rhythm and effort [Auscultation Breath Sounds / Voice Sounds] : lungs were clear to auscultation bilaterally [Heart Rate And Rhythm] : heart rate was normal and rhythm regular [Normal S1, S2] : normal S1 and S2 [Murmurs] : no murmurs [Bowel Sounds] : normal bowel sounds [Abdomen Tenderness] : non-tender [No Masses] : no abdominal mass palpated [Abdomen Soft] : soft [] : no hepatosplenomegaly [Oriented To Time, Place, And Person] : oriented to person, place, and time

## 2024-09-20 LAB — SURGICAL PATHOLOGY STUDY: SIGNIFICANT CHANGE UP

## 2024-11-19 NOTE — ED ADULT NURSE NOTE - MODE OF DISCHARGE
Copied from CRM #[id number]. Topic: MW Clinical Concerns - MW Medical Question  >> Nov 19, 2024  9:47 AM Anali BLACKMAN wrote:  Karolina Jay called to check if results are available:    Patient does not have LiveWell.Does not want to access LiveWell for results; Selected 'Wrap Up CRM' and created new Telephone Encounter after clicking 'Convert to Clinical Call'. Selected Reason for Call 'Results'. Sent Result Request template and routed as routine priority per Clinician KB page to appropriate clinician pool. Readback full message.    -- DO NOT REPLY / DO NOT REPLY ALL --  -- This inbox is not monitored. If this was sent to the wrong provider or department, reroute message to P ECO Reroute pool. --  -- Message is from Engagement Center Operations (ECO) --    Request Result      Which Result are your requesting?  Labs & Xray From 11/18/24    What is the full name of the provider that ordered the lab or test?:  [facility] site name: [facility] Internal Medicine [address]     Caller Information       Contact Date/Time Type Contact Phone/Fax    11/19/2024 09:45 AM CST Phone (Incoming) Karolina Jay [phone]            Alternative phone number: N/A     Can a detailed message be left?: No    Patient has been advised the message will be addressed within 2-3 business days.        
Results relayed per result notes 11/19/24   
Ambulatory

## 2025-01-27 NOTE — ED ADULT NURSE NOTE - CARDIO WDL
Department of Anesthesiology  Preprocedure Note       Name:  Yogesh Solano   Age:  78 y.o.  :  1946                                          MRN:  [id number]         Date:  2025      Surgeon: Surgeon(s):  Otto Lemon MD    Procedure: Procedure(s):  Single lumen smartport insertion left subclavian possible right    Medications prior to admission:   Prior to Admission medications    Medication Sig Start Date End Date Taking? Authorizing Provider   ondansetron (ZOFRAN-ODT) 8 MG TBDP disintegrating tablet Take 1 tablet by mouth every 8 hours as needed for Nausea or Vomiting 25   Chuck Correia MD   prochlorperazine (COMPAZINE) 10 MG tablet Take 1 tablet by mouth every 6 hours as needed (nausea) 25   Chuck Correia MD   lidocaine-prilocaine (EMLA) 2.5-2.5 % cream Apply topically as needed. 25   Chuck Correia MD   clopidogrel (PLAVIX) 75 MG tablet TAKE 1 TABLET BY MOUTH DAILY 24   Chuck Evans PA-C   isosorbide mononitrate (IMDUR) 30 MG extended release tablet TAKE 1 TABLET BY MOUTH DAILY 24   Chuck Evans PA-C   pravastatin (PRAVACHOL) 20 MG tablet TAKE 1 TABLET BY MOUTH DAILY 24   Chuck Evans PA-C   metoprolol tartrate (LOPRESSOR) 25 MG tablet TAKE 1 TABLET BY MOUTH TWICE  DAILY 24   Chuck Evans PA-C   predniSONE (DELTASONE) 2.5 MG tablet Take 1 tablet by mouth daily 25  Yuli Medley APRN - CNP   albuterol (PROVENTIL) (2.5 MG/3ML) 0.083% nebulizer solution Take 3 mLs by nebulization 4 times daily 24   Yuli Medley APRN - CNP   budesonide (PULMICORT) 0.5 MG/2ML nebulizer suspension Take 2 mLs by nebulization 2 times daily Rinse mouth with water, gargle, and spit after use. 24  Yuli Medley APRN - CNP   formoterol (PERFOROMIST) 20 MCG/2ML nebulizer solution Take 2 mLs by nebulization every 12 hours 24   Yuli Medley, SEBASTIAN - CNP   albuterol sulfate HFA (VENTOLIN HFA) 108 (90 Base) MCG/ACT inhaler  Normal rate, regular rhythm, normal S1, S2 heart sounds heard.

## (undated) DEVICE — DRSG MASTISOL

## (undated) DEVICE — DRAPE INSTRUMENT POUCH 6.75" X 11"

## (undated) DEVICE — VENODYNE/SCD SLEEVE CALF MEDIUM

## (undated) DEVICE — BIPOLAR FORCEP KOGENT IRRIGATING STRAIGHT 0.5MM X 7" DISP

## (undated) DEVICE — DRAPE MAGNETIC INSTRUMENT MEDIUM

## (undated) DEVICE — Device

## (undated) DEVICE — PROBE PRASS SLIM MONOPOLAR STIMULATOR

## (undated) DEVICE — BUR ROUTER MED

## (undated) DEVICE — SUT VICRYL 2-0 27" CT-2 UNDYED

## (undated) DEVICE — FOLEY TRAY 16FR 5CC LF UMETER CLOSED

## (undated) DEVICE — MIDAS REX LEGEND MATCH HEAD FLUTED LG BORE 3.0MM X 14CM

## (undated) DEVICE — STRYKER INSTRUMENT BATTERY

## (undated) DEVICE — MIDAS REX LEGEND TAPERED SM BORE 2.3MM X 8CM

## (undated) DEVICE — DRSG TEGADERM 4X4.75"

## (undated) DEVICE — SUT VICRYL PLUS 2-0 18" CT-1 (POP-OFF)

## (undated) DEVICE — ARACHNOID BACKCUTTING LONG HANDLE 8"

## (undated) DEVICE — GLV 8.5 PROTEXIS (WHITE)

## (undated) DEVICE — ARACHNOID CIRCULAR LONG HANDLE 8"

## (undated) DEVICE — LONE STAR RETRACTOR RING 12MM BLUNT DISP

## (undated) DEVICE — KIT DEFENDO 4 OLY 4 PC

## (undated) DEVICE — DRSG TELFA 1 X 3

## (undated) DEVICE — DRAPE VARI-LENS2 MICROSCPOPE 68MM

## (undated) DEVICE — CODMAN PERFORATOR 14MM (BLUE)

## (undated) DEVICE — ARACHNOID BACKCUTTING SUPERFICIAL HANDLE 5"

## (undated) DEVICE — TAPE SILK 3"

## (undated) DEVICE — DRSG TELFA .5 X 3

## (undated) DEVICE — TUBING SUCTION 20FT

## (undated) DEVICE — MEDTRONIC CLIP GUN KIT

## (undated) DEVICE — ARACHNOID CIRCULAR SUPERFICIAL HANDLE 5"

## (undated) DEVICE — AESCULAP SCALPFIX 10 CLIPS

## (undated) DEVICE — GLV 8 PROTEXIS (WHITE)

## (undated) DEVICE — FORCEP ENDOJAW AGTR LC W NDL 2.8MM 230CM DISP

## (undated) DEVICE — PREP BETADINE SPONGE STICKS

## (undated) DEVICE — MARKING PEN W RULER

## (undated) DEVICE — SUT NUROLON 4-0 8-18" TF (POP-OFF)

## (undated) DEVICE — MIDAS REX LEGEND TAPERED SM BORE 1.1MM X 8CM

## (undated) DEVICE — LUBRICATING JELLY ONESHOT 1.25OZ

## (undated) DEVICE — BUR GILLEN SURGICAL ACORN SHORT 7.5MM

## (undated) DEVICE — WARMING BLANKET LOWER ADULT

## (undated) DEVICE — PACK CRANIOTOMY LNX SURGICOUNT

## (undated) DEVICE — CRANIAL MASK TRACKER

## (undated) DEVICE — STAPLER SKIN PROXIMATE